# Patient Record
Sex: FEMALE | Race: WHITE | Employment: OTHER | URBAN - NONMETROPOLITAN AREA
[De-identification: names, ages, dates, MRNs, and addresses within clinical notes are randomized per-mention and may not be internally consistent; named-entity substitution may affect disease eponyms.]

---

## 2017-12-14 ENCOUNTER — OFFICE VISIT (OUTPATIENT)
Dept: FAMILY MEDICINE CLINIC | Age: 82
End: 2017-12-14
Payer: MEDICARE

## 2017-12-14 VITALS
WEIGHT: 145 LBS | RESPIRATION RATE: 16 BRPM | TEMPERATURE: 98.6 F | DIASTOLIC BLOOD PRESSURE: 72 MMHG | OXYGEN SATURATION: 98 % | HEART RATE: 78 BPM | SYSTOLIC BLOOD PRESSURE: 118 MMHG

## 2017-12-14 DIAGNOSIS — I10 ESSENTIAL (PRIMARY) HYPERTENSION: ICD-10-CM

## 2017-12-14 DIAGNOSIS — R60.0 EDEMA OF LEFT LOWER EXTREMITY: ICD-10-CM

## 2017-12-14 DIAGNOSIS — N32.81 OAB (OVERACTIVE BLADDER): ICD-10-CM

## 2017-12-14 DIAGNOSIS — E05.90 SUBCLINICAL HYPERTHYROIDISM: ICD-10-CM

## 2017-12-14 DIAGNOSIS — F03.90 MAJOR NEUROCOGNITIVE DISORDER (HCC): Primary | ICD-10-CM

## 2017-12-14 DIAGNOSIS — E78.01 FAMILIAL HYPERCHOLESTEROLEMIA: ICD-10-CM

## 2017-12-14 PROCEDURE — 99204 OFFICE O/P NEW MOD 45 MIN: CPT | Performed by: FAMILY MEDICINE

## 2017-12-14 PROCEDURE — G8400 PT W/DXA NO RESULTS DOC: HCPCS | Performed by: FAMILY MEDICINE

## 2017-12-14 PROCEDURE — 1123F ACP DISCUSS/DSCN MKR DOCD: CPT | Performed by: FAMILY MEDICINE

## 2017-12-14 PROCEDURE — 1036F TOBACCO NON-USER: CPT | Performed by: FAMILY MEDICINE

## 2017-12-14 PROCEDURE — 4040F PNEUMOC VAC/ADMIN/RCVD: CPT | Performed by: FAMILY MEDICINE

## 2017-12-14 PROCEDURE — G8421 BMI NOT CALCULATED: HCPCS | Performed by: FAMILY MEDICINE

## 2017-12-14 PROCEDURE — 1090F PRES/ABSN URINE INCON ASSESS: CPT | Performed by: FAMILY MEDICINE

## 2017-12-14 PROCEDURE — G8484 FLU IMMUNIZE NO ADMIN: HCPCS | Performed by: FAMILY MEDICINE

## 2017-12-14 PROCEDURE — G8427 DOCREV CUR MEDS BY ELIG CLIN: HCPCS | Performed by: FAMILY MEDICINE

## 2017-12-14 RX ORDER — MULTIVIT-MIN/IRON/FOLIC ACID/K 18-600-40
CAPSULE ORAL
COMMUNITY

## 2017-12-14 RX ORDER — LISINOPRIL 10 MG/1
10 TABLET ORAL EVERY MORNING
Qty: 30 TABLET | Refills: 11 | Status: SHIPPED | OUTPATIENT
Start: 2017-12-14 | End: 2018-03-30 | Stop reason: SDUPTHER

## 2017-12-14 RX ORDER — DONEPEZIL HYDROCHLORIDE 10 MG/1
10 TABLET, FILM COATED ORAL NIGHTLY
COMMUNITY
Start: 2017-09-16 | End: 2017-12-14 | Stop reason: SDUPTHER

## 2017-12-14 RX ORDER — SOLIFENACIN SUCCINATE 10 MG/1
10 TABLET, FILM COATED ORAL EVERY MORNING
COMMUNITY
Start: 2017-11-13 | End: 2017-12-15 | Stop reason: ALTCHOICE

## 2017-12-14 RX ORDER — PRAVASTATIN SODIUM 80 MG/1
80 TABLET ORAL NIGHTLY
Qty: 30 TABLET | Refills: 11 | Status: SHIPPED | OUTPATIENT
Start: 2017-12-14 | End: 2018-03-30 | Stop reason: SDUPTHER

## 2017-12-14 RX ORDER — TOLTERODINE 4 MG/1
4 CAPSULE, EXTENDED RELEASE ORAL DAILY
Qty: 30 CAPSULE | Refills: 5 | Status: SHIPPED | OUTPATIENT
Start: 2017-12-14 | End: 2018-02-19 | Stop reason: ALTCHOICE

## 2017-12-14 RX ORDER — LISINOPRIL 10 MG/1
10 TABLET ORAL EVERY MORNING
COMMUNITY
Start: 2017-09-16 | End: 2017-12-14 | Stop reason: SDUPTHER

## 2017-12-14 RX ORDER — PRAVASTATIN SODIUM 80 MG/1
80 TABLET ORAL NIGHTLY
COMMUNITY
Start: 2017-09-16 | End: 2017-12-14 | Stop reason: SDUPTHER

## 2017-12-14 RX ORDER — MEMANTINE HYDROCHLORIDE 5 MG/1
5 TABLET ORAL 2 TIMES DAILY
Qty: 60 TABLET | Refills: 11 | Status: SHIPPED | OUTPATIENT
Start: 2017-12-14 | End: 2018-02-19 | Stop reason: SDUPTHER

## 2017-12-14 RX ORDER — DONEPEZIL HYDROCHLORIDE 10 MG/1
10 TABLET, FILM COATED ORAL NIGHTLY
Qty: 30 TABLET | Refills: 11 | Status: SHIPPED | OUTPATIENT
Start: 2017-12-14 | End: 2018-12-14 | Stop reason: SDUPTHER

## 2017-12-14 NOTE — PROGRESS NOTES
exhibits no edema. Neurological: She is alert. Psychiatric: She has a normal mood and affect. Her behavior is normal.         No results found for this or any previous visit (from the past 672 hour(s)). Assessment & Plan: The following diagnoses and conditions are stable with no further orders unless indicated:  1. Major neurocognitive disorder  Reviewed past medical history. No adverse effects with Aricpet. We'll add Namenda. Discussed risks and benefits of this new medication. Discussed potential side effects. She voiced understanding. Recommend starting 5 mg daily for the next week. Increase to 5 mg twice a day. - donepezil (ARICEPT) 10 MG tablet; Take 1 tablet by mouth nightly  Dispense: 30 tablet; Refill: 11  - memantine (NAMENDA) 5 MG tablet; Take 1 tablet by mouth 2 times daily  Dispense: 60 tablet; Refill: 11    2. Essential (primary) hypertension  Blood pressure is controlled with current medication refilled  - lisinopril (PRINIVIL;ZESTRIL) 10 MG tablet; Take 1 tablet by mouth every morning  Dispense: 30 tablet; Refill: 11    3. OAB (overactive bladder)  Does not appear to be experiencing significant improvement with Vesicare. Will switch to Detrol  Discussed risks and benefits of this new medication. Discussed potential side effects. She voiced understanding.     - tolterodine (DETROL LA) 4 MG extended release capsule; Take 1 capsule by mouth daily  Dispense: 30 capsule; Refill: 5    4. Familial hypercholesterolemia  Continue Pravachol.  - pravastatin (PRAVACHOL) 80 MG tablet; Take 1 tablet by mouth nightly  Dispense: 30 tablet; Refill: 11    5. Subclinical hyperthyroidism  Reviewed laboratory work showing suppressed TSH with T3 and T4 have been stable    6. Edema of left lower extremity  Have a history of DVT of the left lower extremity. Has experienced swelling since then. Was a compression stocking with excellent results. No cramping redness or discomfort.     Return in

## 2017-12-15 PROBLEM — R60.0 EDEMA OF LEFT LOWER EXTREMITY: Status: ACTIVE | Noted: 2017-12-15

## 2017-12-15 PROBLEM — E05.90 SUBCLINICAL HYPERTHYROIDISM: Status: ACTIVE | Noted: 2017-12-15

## 2017-12-15 ASSESSMENT — ENCOUNTER SYMPTOMS
COUGH: 0
DIARRHEA: 0
CHEST TIGHTNESS: 0
ANAL BLEEDING: 0
CONSTIPATION: 0
NAUSEA: 0
SHORTNESS OF BREATH: 0
ABDOMINAL PAIN: 0

## 2018-02-19 ENCOUNTER — OFFICE VISIT (OUTPATIENT)
Dept: FAMILY MEDICINE CLINIC | Age: 83
End: 2018-02-19
Payer: MEDICARE

## 2018-02-19 VITALS
DIASTOLIC BLOOD PRESSURE: 62 MMHG | HEART RATE: 74 BPM | SYSTOLIC BLOOD PRESSURE: 118 MMHG | TEMPERATURE: 97.4 F | OXYGEN SATURATION: 98 % | WEIGHT: 143 LBS | RESPIRATION RATE: 16 BRPM

## 2018-02-19 DIAGNOSIS — F03.90 MAJOR NEUROCOGNITIVE DISORDER (HCC): Primary | ICD-10-CM

## 2018-02-19 DIAGNOSIS — F03.90 MAJOR NEUROCOGNITIVE DISORDER (HCC): ICD-10-CM

## 2018-02-19 DIAGNOSIS — I10 ESSENTIAL (PRIMARY) HYPERTENSION: ICD-10-CM

## 2018-02-19 DIAGNOSIS — E05.90 SUBCLINICAL HYPERTHYROIDISM: ICD-10-CM

## 2018-02-19 DIAGNOSIS — E78.01 FAMILIAL HYPERCHOLESTEROLEMIA: ICD-10-CM

## 2018-02-19 DIAGNOSIS — N32.81 OAB (OVERACTIVE BLADDER): ICD-10-CM

## 2018-02-19 LAB
ALBUMIN SERPL-MCNC: 4.3 G/DL (ref 3.5–5.2)
ALP BLD-CCNC: 51 U/L (ref 35–104)
ALT SERPL-CCNC: 19 U/L (ref 5–33)
ANION GAP SERPL CALCULATED.3IONS-SCNC: 12 MMOL/L (ref 7–19)
AST SERPL-CCNC: 20 U/L (ref 5–32)
BASOPHILS ABSOLUTE: 0.1 K/UL (ref 0–0.2)
BASOPHILS RELATIVE PERCENT: 0.7 % (ref 0–1)
BILIRUB SERPL-MCNC: 0.6 MG/DL (ref 0.2–1.2)
BUN BLDV-MCNC: 14 MG/DL (ref 8–23)
CALCIUM SERPL-MCNC: 9.7 MG/DL (ref 8.8–10.2)
CHLORIDE BLD-SCNC: 99 MMOL/L (ref 98–111)
CHOLESTEROL, TOTAL: 174 MG/DL (ref 160–199)
CO2: 29 MMOL/L (ref 22–29)
CREAT SERPL-MCNC: 0.7 MG/DL (ref 0.5–0.9)
EOSINOPHILS ABSOLUTE: 0.1 K/UL (ref 0–0.6)
EOSINOPHILS RELATIVE PERCENT: 1.9 % (ref 0–5)
FOLATE: >20 NG/ML (ref 4.8–37.3)
GFR NON-AFRICAN AMERICAN: >60
GLUCOSE BLD-MCNC: 83 MG/DL (ref 74–109)
HCT VFR BLD CALC: 44.8 % (ref 37–47)
HDLC SERPL-MCNC: 74 MG/DL (ref 65–121)
HEMOGLOBIN: 14.2 G/DL (ref 12–16)
LDL CHOLESTEROL CALCULATED: 85 MG/DL
LYMPHOCYTES ABSOLUTE: 1.5 K/UL (ref 1.1–4.5)
LYMPHOCYTES RELATIVE PERCENT: 22.3 % (ref 20–40)
MCH RBC QN AUTO: 30 PG (ref 27–31)
MCHC RBC AUTO-ENTMCNC: 31.7 G/DL (ref 33–37)
MCV RBC AUTO: 94.7 FL (ref 81–99)
MONOCYTES ABSOLUTE: 0.5 K/UL (ref 0–0.9)
MONOCYTES RELATIVE PERCENT: 7.3 % (ref 0–10)
NEUTROPHILS ABSOLUTE: 4.6 K/UL (ref 1.5–7.5)
NEUTROPHILS RELATIVE PERCENT: 67.5 % (ref 50–65)
PDW BLD-RTO: 12.4 % (ref 11.5–14.5)
PLATELET # BLD: 231 K/UL (ref 130–400)
PMV BLD AUTO: 9.9 FL (ref 9.4–12.3)
POTASSIUM SERPL-SCNC: 4.2 MMOL/L (ref 3.5–5)
RBC # BLD: 4.73 M/UL (ref 4.2–5.4)
SEDIMENTATION RATE, ERYTHROCYTE: 13 MM/HR (ref 0–25)
SODIUM BLD-SCNC: 140 MMOL/L (ref 136–145)
T4 FREE: 1.3 NG/DL (ref 0.9–1.7)
TOTAL PROTEIN: 7.5 G/DL (ref 6.6–8.7)
TRIGL SERPL-MCNC: 76 MG/DL (ref 0–149)
TSH SERPL DL<=0.05 MIU/L-ACNC: <0.005 UIU/ML (ref 0.27–4.2)
VITAMIN B-12: 355 PG/ML (ref 211–946)
WBC # BLD: 6.9 K/UL (ref 4.8–10.8)

## 2018-02-19 PROCEDURE — G8421 BMI NOT CALCULATED: HCPCS | Performed by: FAMILY MEDICINE

## 2018-02-19 PROCEDURE — 1123F ACP DISCUSS/DSCN MKR DOCD: CPT | Performed by: FAMILY MEDICINE

## 2018-02-19 PROCEDURE — G8427 DOCREV CUR MEDS BY ELIG CLIN: HCPCS | Performed by: FAMILY MEDICINE

## 2018-02-19 PROCEDURE — 99214 OFFICE O/P EST MOD 30 MIN: CPT | Performed by: FAMILY MEDICINE

## 2018-02-19 PROCEDURE — 1090F PRES/ABSN URINE INCON ASSESS: CPT | Performed by: FAMILY MEDICINE

## 2018-02-19 PROCEDURE — G8484 FLU IMMUNIZE NO ADMIN: HCPCS | Performed by: FAMILY MEDICINE

## 2018-02-19 PROCEDURE — G8400 PT W/DXA NO RESULTS DOC: HCPCS | Performed by: FAMILY MEDICINE

## 2018-02-19 PROCEDURE — 1036F TOBACCO NON-USER: CPT | Performed by: FAMILY MEDICINE

## 2018-02-19 PROCEDURE — 4040F PNEUMOC VAC/ADMIN/RCVD: CPT | Performed by: FAMILY MEDICINE

## 2018-02-19 RX ORDER — MEMANTINE HYDROCHLORIDE 10 MG/1
10 TABLET ORAL 2 TIMES DAILY
Qty: 60 TABLET | Refills: 5 | Status: SHIPPED | OUTPATIENT
Start: 2018-02-19 | End: 2018-08-24 | Stop reason: SDUPTHER

## 2018-02-19 ASSESSMENT — ENCOUNTER SYMPTOMS
CONSTIPATION: 0
COUGH: 0
CHEST TIGHTNESS: 0
NAUSEA: 0
ABDOMINAL PAIN: 0
SHORTNESS OF BREATH: 0
ANAL BLEEDING: 0
DIARRHEA: 0

## 2018-02-19 NOTE — PROGRESS NOTES
[facility]  [facility]ICINE  [facility]  [address] [place] [id number]  Dept: [phone]  Dept Fax: [phone]  Loc: [phone]    Mercedes Erickson is a [de-identified] y.o. female who presents today for her medical conditions/complaints as noted below. Mercedes Erickson is here for Hypertension        HPI:02   CC:  Here today to discuss the following:    Here for follow-up today. Established with me on December 14. Suffers from Alzheimer's dementia. She is stable since last visit. No major    Hypertension  Compliant with medications. No adverse effects from medication. No lightheadedness, palpitations, or chest pain. Hyperlipidemia  Tolerating current cholesterol medication without side effects. No body aches. Attempting to reduce processed sugar and cholesterol from diet. They have tried Vesicare and Detrol for overactive bladder. Both medications were over $100. They did not feel they provided much relief and have discontinued the medication.               HPI    Past Medical History:   Diagnosis Date    DVT (deep venous thrombosis) (HCC)     left lower extremity      Past Surgical History:   Procedure Laterality Date    COLONOSCOPY  2012       Family History   Problem Relation Age of Onset    Colon Cancer Daughter        Social History   Substance Use Topics    Smoking status: Never Smoker    Smokeless tobacco: Never Used    Alcohol use No      Current Outpatient Prescriptions   Medication Sig Dispense Refill    memantine (NAMENDA) 10 MG tablet Take 1 tablet by mouth 2 times daily 60 tablet 5    Cholecalciferol (VITAMIN D) 2000 units CAPS capsule Take by mouth      aspirin 81 MG tablet Take 81 mg by mouth daily      donepezil (ARICEPT) 10 MG tablet Take 1 tablet by mouth nightly 30 tablet 11    lisinopril (PRINIVIL;ZESTRIL) 10 MG tablet Take 1 tablet by mouth every morning 30 tablet 11    pravastatin (PRAVACHOL) 80 MG tablet Take 1 tablet by mouth nightly 30 tablet 11     No current facility-administered medications for this visit. No Known Allergies    Health Maintenance   Topic Date Due    TSH testing  1935    Potassium monitoring  1935    Creatinine monitoring  1935    DTaP/Tdap/Td vaccine (1 - Tdap) 05/24/1956    Zostavax vaccine  05/24/1997    DEXA (modify frequency per FRAX score)  05/24/2002    Flu vaccine  Completed    Pneumococcal low/med risk  Completed       Subjective:      Review of Systems   Constitutional: Negative for chills and fever. HENT: Negative for congestion. Respiratory: Negative for cough, chest tightness and shortness of breath. Cardiovascular: Negative for chest pain, palpitations and leg swelling. Gastrointestinal: Negative for abdominal pain, anal bleeding, constipation, diarrhea and nausea. Genitourinary: Negative for difficulty urinating. Psychiatric/Behavioral: Negative. See HPI for visit specific review of symptoms. All others negative      Objective:   /62   Pulse 74   Temp 97.4 °F (36.3 °C) (Temporal)   Resp 16   Wt 143 lb (64.9 kg)   SpO2 98%   Physical Exam   Constitutional: She appears well-developed. She does not appear ill. Eyes: Pupils are equal, round, and reactive to light. Neck: Normal range of motion. Neck supple. Cardiovascular: Normal rate and regular rhythm. Exam reveals no friction rub. No murmur heard. Pulmonary/Chest: Effort normal and breath sounds normal. No respiratory distress. She has no wheezes. She has no rales. Abdominal: Soft. Bowel sounds are normal. She exhibits no distension. There is no tenderness. There is no rebound and no guarding. Musculoskeletal: She exhibits no edema. Neurological: She is alert. Psychiatric: She has a normal mood and affect. Her behavior is normal.         No results found for this or any previous visit (from the past 672 hour(s)). Assessment & Plan:         The following diagnoses and

## 2018-02-22 LAB — ANA IGG, ELISA: NORMAL

## 2018-03-30 DIAGNOSIS — E78.01 FAMILIAL HYPERCHOLESTEROLEMIA: ICD-10-CM

## 2018-03-30 DIAGNOSIS — I10 ESSENTIAL (PRIMARY) HYPERTENSION: ICD-10-CM

## 2018-03-30 RX ORDER — LISINOPRIL 10 MG/1
10 TABLET ORAL EVERY MORNING
Qty: 90 TABLET | Refills: 3 | Status: SHIPPED | OUTPATIENT
Start: 2018-03-30 | End: 2019-09-14 | Stop reason: SDUPTHER

## 2018-03-30 RX ORDER — PRAVASTATIN SODIUM 80 MG/1
80 TABLET ORAL NIGHTLY
Qty: 90 TABLET | Refills: 3 | Status: SHIPPED | OUTPATIENT
Start: 2018-03-30 | End: 2019-09-14 | Stop reason: SDUPTHER

## 2018-06-19 ENCOUNTER — OFFICE VISIT (OUTPATIENT)
Dept: FAMILY MEDICINE CLINIC | Age: 83
End: 2018-06-19
Payer: MEDICARE

## 2018-06-19 VITALS
WEIGHT: 152 LBS | SYSTOLIC BLOOD PRESSURE: 112 MMHG | HEIGHT: 64 IN | OXYGEN SATURATION: 95 % | DIASTOLIC BLOOD PRESSURE: 80 MMHG | HEART RATE: 78 BPM | RESPIRATION RATE: 16 BRPM | TEMPERATURE: 97.8 F | BODY MASS INDEX: 25.95 KG/M2

## 2018-06-19 DIAGNOSIS — E78.01 FAMILIAL HYPERCHOLESTEROLEMIA: ICD-10-CM

## 2018-06-19 DIAGNOSIS — I10 ESSENTIAL (PRIMARY) HYPERTENSION: ICD-10-CM

## 2018-06-19 DIAGNOSIS — R60.0 EDEMA OF LEFT LOWER EXTREMITY: ICD-10-CM

## 2018-06-19 DIAGNOSIS — Z00.00 ANNUAL PHYSICAL EXAM: Primary | ICD-10-CM

## 2018-06-19 DIAGNOSIS — F03.90 MAJOR NEUROCOGNITIVE DISORDER (HCC): ICD-10-CM

## 2018-06-19 DIAGNOSIS — N32.81 OAB (OVERACTIVE BLADDER): ICD-10-CM

## 2018-06-19 DIAGNOSIS — E05.90 SUBCLINICAL HYPERTHYROIDISM: ICD-10-CM

## 2018-06-19 PROCEDURE — 1090F PRES/ABSN URINE INCON ASSESS: CPT | Performed by: FAMILY MEDICINE

## 2018-06-19 PROCEDURE — 99214 OFFICE O/P EST MOD 30 MIN: CPT | Performed by: FAMILY MEDICINE

## 2018-06-19 PROCEDURE — 1036F TOBACCO NON-USER: CPT | Performed by: FAMILY MEDICINE

## 2018-06-19 PROCEDURE — G8400 PT W/DXA NO RESULTS DOC: HCPCS | Performed by: FAMILY MEDICINE

## 2018-06-19 PROCEDURE — 4040F PNEUMOC VAC/ADMIN/RCVD: CPT | Performed by: FAMILY MEDICINE

## 2018-06-19 PROCEDURE — G0439 PPPS, SUBSEQ VISIT: HCPCS | Performed by: FAMILY MEDICINE

## 2018-06-19 PROCEDURE — 1123F ACP DISCUSS/DSCN MKR DOCD: CPT | Performed by: FAMILY MEDICINE

## 2018-06-19 PROCEDURE — G8419 CALC BMI OUT NRM PARAM NOF/U: HCPCS | Performed by: FAMILY MEDICINE

## 2018-06-19 PROCEDURE — G8427 DOCREV CUR MEDS BY ELIG CLIN: HCPCS | Performed by: FAMILY MEDICINE

## 2018-06-19 ASSESSMENT — PATIENT HEALTH QUESTIONNAIRE - PHQ9: SUM OF ALL RESPONSES TO PHQ QUESTIONS 1-9: 0

## 2018-06-19 ASSESSMENT — ANXIETY QUESTIONNAIRES: GAD7 TOTAL SCORE: 0

## 2018-06-19 ASSESSMENT — LIFESTYLE VARIABLES: HOW OFTEN DO YOU HAVE A DRINK CONTAINING ALCOHOL: 0

## 2018-06-23 ENCOUNTER — APPOINTMENT (OUTPATIENT)
Dept: CT IMAGING | Facility: HOSPITAL | Age: 83
End: 2018-06-23

## 2018-06-23 ENCOUNTER — HOSPITAL ENCOUNTER (EMERGENCY)
Facility: HOSPITAL | Age: 83
Discharge: HOME | End: 2018-06-23
Attending: EMERGENCY MEDICINE | Admitting: EMERGENCY MEDICINE

## 2018-06-23 ENCOUNTER — APPOINTMENT (OUTPATIENT)
Dept: GENERAL RADIOLOGY | Facility: HOSPITAL | Age: 83
End: 2018-06-23

## 2018-06-23 VITALS
WEIGHT: 150 LBS | DIASTOLIC BLOOD PRESSURE: 63 MMHG | SYSTOLIC BLOOD PRESSURE: 118 MMHG | BODY MASS INDEX: 25.61 KG/M2 | OXYGEN SATURATION: 96 % | HEIGHT: 64 IN | HEART RATE: 67 BPM | RESPIRATION RATE: 16 BRPM | TEMPERATURE: 98.2 F
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
## 2018-06-23 DIAGNOSIS — S61.412A LACERATION OF LEFT HAND WITHOUT FOREIGN BODY, INITIAL ENCOUNTER: ICD-10-CM

## 2018-06-23 DIAGNOSIS — S32.030A CLOSED COMPRESSION FRACTURE OF THIRD LUMBAR VERTEBRA, INITIAL ENCOUNTER: Primary | ICD-10-CM

## 2018-06-23 PROCEDURE — 99284 EMERGENCY DEPT VISIT MOD MDM: CPT

## 2018-06-23 PROCEDURE — 72110 X-RAY EXAM L-2 SPINE 4/>VWS: CPT

## 2018-06-23 PROCEDURE — 73130 X-RAY EXAM OF HAND: CPT

## 2018-06-23 PROCEDURE — 90714 TD VACC NO PRESV 7 YRS+ IM: CPT | Performed by: EMERGENCY MEDICINE

## 2018-06-23 PROCEDURE — 25010000002 TETANUS-DIPHTHERIA TOXOIDS (ADULT) PER 0.5 ML: Performed by: EMERGENCY MEDICINE

## 2018-06-23 PROCEDURE — 90471 IMMUNIZATION ADMIN: CPT

## 2018-06-23 PROCEDURE — 72131 CT LUMBAR SPINE W/O DYE: CPT

## 2018-06-23 RX ORDER — PRAVASTATIN SODIUM 40 MG
80 TABLET ORAL NIGHTLY
COMMUNITY

## 2018-06-23 RX ORDER — HYDROCODONE BITARTRATE AND ACETAMINOPHEN 5; 325 MG/1; MG/1
1 TABLET ORAL ONCE
Status: COMPLETED | OUTPATIENT
Start: 2018-06-23 | End: 2018-06-23

## 2018-06-23 RX ORDER — HYDROCODONE BITARTRATE AND ACETAMINOPHEN 5; 325 MG/1; MG/1
1 TABLET ORAL EVERY 4 HOURS PRN
Qty: 12 TABLET | Refills: 0 | Status: SHIPPED | OUTPATIENT
Start: 2018-06-23

## 2018-06-23 RX ORDER — LISINOPRIL 10 MG/1
10 TABLET ORAL DAILY
COMMUNITY

## 2018-06-23 RX ORDER — ASPIRIN 81 MG/1
81 TABLET ORAL DAILY
COMMUNITY

## 2018-06-23 RX ORDER — DONEPEZIL HYDROCHLORIDE 10 MG/1
10 TABLET, FILM COATED ORAL NIGHTLY
COMMUNITY

## 2018-06-23 RX ORDER — MEMANTINE HYDROCHLORIDE 10 MG/1
10 TABLET ORAL 2 TIMES DAILY
COMMUNITY

## 2018-06-23 RX ADMIN — CLOSTRIDIUM TETANI TOXOID ANTIGEN (FORMALDEHYDE INACTIVATED) AND CORYNEBACTERIUM DIPHTHERIAE TOXOID ANTIGEN (FORMALDEHYDE INACTIVATED) 0.5 ML: 5; 2 INJECTION, SUSPENSION INTRAMUSCULAR at 03:55

## 2018-06-23 RX ADMIN — HYDROCODONE BITARTRATE AND ACETAMINOPHEN 1 TABLET: 5; 325 TABLET ORAL at 03:54

## 2018-07-27 PROBLEM — M81.0 AGE-RELATED OSTEOPOROSIS WITHOUT CURRENT PATHOLOGICAL FRACTURE: Status: ACTIVE | Noted: 2018-07-27

## 2018-08-17 ENCOUNTER — APPOINTMENT (OUTPATIENT)
Dept: ONCOLOGY | Facility: HOSPITAL | Age: 83
End: 2018-08-17

## 2018-09-27 ENCOUNTER — TELEPHONE (OUTPATIENT)
Dept: FAMILY MEDICINE CLINIC | Age: 83
End: 2018-09-27

## 2018-12-14 DIAGNOSIS — F03.90 MAJOR NEUROCOGNITIVE DISORDER (HCC): ICD-10-CM

## 2018-12-14 RX ORDER — DONEPEZIL HYDROCHLORIDE 10 MG/1
TABLET, FILM COATED ORAL
Qty: 30 TABLET | Refills: 11 | Status: SHIPPED | OUTPATIENT
Start: 2018-12-14 | End: 2019-01-01

## 2018-12-17 RX ORDER — DONEPEZIL HYDROCHLORIDE 10 MG/1
10 TABLET, FILM COATED ORAL NIGHTLY
Qty: 30 TABLET | Refills: 11 | OUTPATIENT
Start: 2018-12-17

## 2019-01-01 ENCOUNTER — HOSPITAL ENCOUNTER (OUTPATIENT)
Dept: MRI IMAGING | Age: 84
Discharge: HOME | End: 2019-12-16
Payer: MEDICARE

## 2019-01-01 ENCOUNTER — OFFICE VISIT (OUTPATIENT)
Dept: NEUROLOGY | Age: 84
End: 2019-01-01
Payer: MEDICARE

## 2019-01-01 ENCOUNTER — TELEPHONE (OUTPATIENT)
Dept: FAMILY MEDICINE CLINIC | Age: 84
End: 2019-01-01

## 2019-01-01 VITALS
BODY MASS INDEX: 27.29 KG/M2 | SYSTOLIC BLOOD PRESSURE: 121 MMHG | HEART RATE: 85 BPM | DIASTOLIC BLOOD PRESSURE: 67 MMHG | WEIGHT: 154 LBS | HEIGHT: 63 IN

## 2019-01-01 DIAGNOSIS — R41.3 MEMORY LOSS: Primary | ICD-10-CM

## 2019-01-01 DIAGNOSIS — R27.0 ATAXIA: ICD-10-CM

## 2019-01-01 DIAGNOSIS — Z86.39 HISTORY OF HYPERLIPIDEMIA: ICD-10-CM

## 2019-01-01 DIAGNOSIS — R41.3 MEMORY LOSS: ICD-10-CM

## 2019-01-01 PROCEDURE — G8482 FLU IMMUNIZE ORDER/ADMIN: HCPCS | Performed by: PSYCHIATRY & NEUROLOGY

## 2019-01-01 PROCEDURE — 4040F PNEUMOC VAC/ADMIN/RCVD: CPT | Performed by: PSYCHIATRY & NEUROLOGY

## 2019-01-01 PROCEDURE — G8417 CALC BMI ABV UP PARAM F/U: HCPCS | Performed by: PSYCHIATRY & NEUROLOGY

## 2019-01-01 PROCEDURE — 1036F TOBACCO NON-USER: CPT | Performed by: PSYCHIATRY & NEUROLOGY

## 2019-01-01 PROCEDURE — G8400 PT W/DXA NO RESULTS DOC: HCPCS | Performed by: PSYCHIATRY & NEUROLOGY

## 2019-01-01 PROCEDURE — 1090F PRES/ABSN URINE INCON ASSESS: CPT | Performed by: PSYCHIATRY & NEUROLOGY

## 2019-01-01 PROCEDURE — G8427 DOCREV CUR MEDS BY ELIG CLIN: HCPCS | Performed by: PSYCHIATRY & NEUROLOGY

## 2019-01-01 PROCEDURE — 70551 MRI BRAIN STEM W/O DYE: CPT

## 2019-01-01 PROCEDURE — 99204 OFFICE O/P NEW MOD 45 MIN: CPT | Performed by: PSYCHIATRY & NEUROLOGY

## 2019-01-01 PROCEDURE — 1123F ACP DISCUSS/DSCN MKR DOCD: CPT | Performed by: PSYCHIATRY & NEUROLOGY

## 2019-01-01 RX ORDER — LANOLIN ALCOHOL/MO/W.PET/CERES
3 CREAM (GRAM) TOPICAL NIGHTLY PRN
COMMUNITY

## 2019-02-18 ENCOUNTER — OFFICE VISIT (OUTPATIENT)
Dept: FAMILY MEDICINE CLINIC | Age: 84
End: 2019-02-18
Payer: MEDICARE

## 2019-02-18 VITALS
BODY MASS INDEX: 27.98 KG/M2 | HEART RATE: 69 BPM | DIASTOLIC BLOOD PRESSURE: 68 MMHG | SYSTOLIC BLOOD PRESSURE: 122 MMHG | WEIGHT: 163 LBS | OXYGEN SATURATION: 98 % | TEMPERATURE: 97.7 F

## 2019-02-18 DIAGNOSIS — F03.90 MAJOR NEUROCOGNITIVE DISORDER (HCC): ICD-10-CM

## 2019-02-18 DIAGNOSIS — I10 ESSENTIAL (PRIMARY) HYPERTENSION: ICD-10-CM

## 2019-02-18 DIAGNOSIS — E78.01 FAMILIAL HYPERCHOLESTEROLEMIA: Primary | ICD-10-CM

## 2019-02-18 DIAGNOSIS — R53.83 OTHER FATIGUE: ICD-10-CM

## 2019-02-18 PROCEDURE — G8419 CALC BMI OUT NRM PARAM NOF/U: HCPCS | Performed by: FAMILY MEDICINE

## 2019-02-18 PROCEDURE — 1101F PT FALLS ASSESS-DOCD LE1/YR: CPT | Performed by: FAMILY MEDICINE

## 2019-02-18 PROCEDURE — 1123F ACP DISCUSS/DSCN MKR DOCD: CPT | Performed by: FAMILY MEDICINE

## 2019-02-18 PROCEDURE — 99214 OFFICE O/P EST MOD 30 MIN: CPT | Performed by: FAMILY MEDICINE

## 2019-02-18 PROCEDURE — 1090F PRES/ABSN URINE INCON ASSESS: CPT | Performed by: FAMILY MEDICINE

## 2019-02-18 PROCEDURE — G8427 DOCREV CUR MEDS BY ELIG CLIN: HCPCS | Performed by: FAMILY MEDICINE

## 2019-02-18 PROCEDURE — 1036F TOBACCO NON-USER: CPT | Performed by: FAMILY MEDICINE

## 2019-02-18 PROCEDURE — 4040F PNEUMOC VAC/ADMIN/RCVD: CPT | Performed by: FAMILY MEDICINE

## 2019-02-18 PROCEDURE — G8400 PT W/DXA NO RESULTS DOC: HCPCS | Performed by: FAMILY MEDICINE

## 2019-02-18 PROCEDURE — G8482 FLU IMMUNIZE ORDER/ADMIN: HCPCS | Performed by: FAMILY MEDICINE

## 2019-02-18 ASSESSMENT — PATIENT HEALTH QUESTIONNAIRE - PHQ9
SUM OF ALL RESPONSES TO PHQ9 QUESTIONS 1 & 2: 0
SUM OF ALL RESPONSES TO PHQ QUESTIONS 1-9: 0
1. LITTLE INTEREST OR PLEASURE IN DOING THINGS: 0
2. FEELING DOWN, DEPRESSED OR HOPELESS: 0
SUM OF ALL RESPONSES TO PHQ QUESTIONS 1-9: 0

## 2019-08-13 DIAGNOSIS — F03.90 MAJOR NEUROCOGNITIVE DISORDER (HCC): ICD-10-CM

## 2019-08-13 RX ORDER — MEMANTINE HYDROCHLORIDE 10 MG/1
TABLET ORAL
Qty: 180 TABLET | Refills: 3 | Status: SHIPPED | OUTPATIENT
Start: 2019-08-13 | End: 2020-01-01 | Stop reason: SDUPTHER

## 2019-09-11 ENCOUNTER — TELEPHONE (OUTPATIENT)
Dept: ADMINISTRATIVE | Age: 84
End: 2019-09-11

## 2019-09-14 DIAGNOSIS — E78.01 FAMILIAL HYPERCHOLESTEROLEMIA: ICD-10-CM

## 2019-09-14 DIAGNOSIS — I10 ESSENTIAL (PRIMARY) HYPERTENSION: ICD-10-CM

## 2019-09-16 RX ORDER — PRAVASTATIN SODIUM 80 MG/1
80 TABLET ORAL NIGHTLY
Qty: 90 TABLET | Refills: 3 | Status: SHIPPED | OUTPATIENT
Start: 2019-09-16 | End: 2019-10-04 | Stop reason: SDUPTHER

## 2019-09-16 RX ORDER — LISINOPRIL 10 MG/1
TABLET ORAL
Qty: 90 TABLET | Refills: 3 | Status: SHIPPED | OUTPATIENT
Start: 2019-09-16

## 2019-10-04 ENCOUNTER — OFFICE VISIT (OUTPATIENT)
Dept: FAMILY MEDICINE CLINIC | Age: 84
End: 2019-10-04
Payer: MEDICARE

## 2019-10-04 VITALS
HEART RATE: 72 BPM | HEIGHT: 63 IN | SYSTOLIC BLOOD PRESSURE: 102 MMHG | WEIGHT: 151 LBS | DIASTOLIC BLOOD PRESSURE: 64 MMHG | OXYGEN SATURATION: 96 % | TEMPERATURE: 97.3 F | BODY MASS INDEX: 26.75 KG/M2

## 2019-10-04 DIAGNOSIS — I10 ESSENTIAL (PRIMARY) HYPERTENSION: ICD-10-CM

## 2019-10-04 DIAGNOSIS — F03.90 MAJOR NEUROCOGNITIVE DISORDER (HCC): ICD-10-CM

## 2019-10-04 DIAGNOSIS — R29.898 MUSCULAR DECONDITIONING: ICD-10-CM

## 2019-10-04 DIAGNOSIS — E78.01 FAMILIAL HYPERCHOLESTEROLEMIA: ICD-10-CM

## 2019-10-04 DIAGNOSIS — Z23 NEED FOR INFLUENZA VACCINATION: Primary | ICD-10-CM

## 2019-10-04 PROCEDURE — G8419 CALC BMI OUT NRM PARAM NOF/U: HCPCS | Performed by: FAMILY MEDICINE

## 2019-10-04 PROCEDURE — G8482 FLU IMMUNIZE ORDER/ADMIN: HCPCS | Performed by: FAMILY MEDICINE

## 2019-10-04 PROCEDURE — 99214 OFFICE O/P EST MOD 30 MIN: CPT | Performed by: FAMILY MEDICINE

## 2019-10-04 PROCEDURE — G8427 DOCREV CUR MEDS BY ELIG CLIN: HCPCS | Performed by: FAMILY MEDICINE

## 2019-10-04 PROCEDURE — 4040F PNEUMOC VAC/ADMIN/RCVD: CPT | Performed by: FAMILY MEDICINE

## 2019-10-04 PROCEDURE — 1123F ACP DISCUSS/DSCN MKR DOCD: CPT | Performed by: FAMILY MEDICINE

## 2019-10-04 PROCEDURE — G0008 ADMIN INFLUENZA VIRUS VAC: HCPCS | Performed by: FAMILY MEDICINE

## 2019-10-04 PROCEDURE — 1090F PRES/ABSN URINE INCON ASSESS: CPT | Performed by: FAMILY MEDICINE

## 2019-10-04 PROCEDURE — 1036F TOBACCO NON-USER: CPT | Performed by: FAMILY MEDICINE

## 2019-10-04 PROCEDURE — G8400 PT W/DXA NO RESULTS DOC: HCPCS | Performed by: FAMILY MEDICINE

## 2019-10-04 PROCEDURE — 90653 IIV ADJUVANT VACCINE IM: CPT | Performed by: FAMILY MEDICINE

## 2019-10-04 RX ORDER — PRAVASTATIN SODIUM 80 MG/1
80 TABLET ORAL NIGHTLY
Qty: 90 TABLET | Refills: 3 | Status: SHIPPED | OUTPATIENT
Start: 2019-10-04

## 2019-10-16 ASSESSMENT — ENCOUNTER SYMPTOMS
ABDOMINAL PAIN: 0
COUGH: 0
DIARRHEA: 0
CHEST TIGHTNESS: 0
SHORTNESS OF BREATH: 0
CONSTIPATION: 0
NAUSEA: 0
ANAL BLEEDING: 0

## 2019-10-18 ENCOUNTER — TELEPHONE (OUTPATIENT)
Dept: NEUROLOGY | Age: 84
End: 2019-10-18

## 2020-01-01 ENCOUNTER — HOSPITAL ENCOUNTER (INPATIENT)
Age: 85
LOS: 1 days | Discharge: HOSPICE | DRG: 640 | End: 2020-10-02
Attending: EMERGENCY MEDICINE | Admitting: FAMILY MEDICINE
Payer: MEDICARE

## 2020-01-01 ENCOUNTER — OFFICE VISIT (OUTPATIENT)
Dept: FAMILY MEDICINE CLINIC | Age: 85
End: 2020-01-01
Payer: MEDICARE

## 2020-01-01 ENCOUNTER — APPOINTMENT (OUTPATIENT)
Dept: CT IMAGING | Age: 85
DRG: 640 | End: 2020-01-01
Payer: MEDICARE

## 2020-01-01 ENCOUNTER — OFFICE VISIT (OUTPATIENT)
Dept: NEUROLOGY | Age: 85
End: 2020-01-01
Payer: MEDICARE

## 2020-01-01 ENCOUNTER — APPOINTMENT (OUTPATIENT)
Dept: GENERAL RADIOLOGY | Age: 85
DRG: 640 | End: 2020-01-01
Payer: MEDICARE

## 2020-01-01 ENCOUNTER — TELEMEDICINE (OUTPATIENT)
Dept: FAMILY MEDICINE CLINIC | Age: 85
End: 2020-01-01
Payer: MEDICARE

## 2020-01-01 ENCOUNTER — TELEPHONE (OUTPATIENT)
Dept: INTERNAL MEDICINE | Age: 85
End: 2020-01-01

## 2020-01-01 ENCOUNTER — PATIENT MESSAGE (OUTPATIENT)
Dept: FAMILY MEDICINE CLINIC | Age: 85
End: 2020-01-01

## 2020-01-01 VITALS
HEART RATE: 60 BPM | TEMPERATURE: 97.7 F | OXYGEN SATURATION: 98 % | SYSTOLIC BLOOD PRESSURE: 116 MMHG | DIASTOLIC BLOOD PRESSURE: 60 MMHG

## 2020-01-01 VITALS
TEMPERATURE: 97.6 F | HEART RATE: 84 BPM | DIASTOLIC BLOOD PRESSURE: 65 MMHG | HEIGHT: 63 IN | BODY MASS INDEX: 27.28 KG/M2 | OXYGEN SATURATION: 96 % | SYSTOLIC BLOOD PRESSURE: 122 MMHG | RESPIRATION RATE: 16 BRPM

## 2020-01-01 VITALS
SYSTOLIC BLOOD PRESSURE: 120 MMHG | DIASTOLIC BLOOD PRESSURE: 64 MMHG | HEART RATE: 82 BPM | HEIGHT: 63 IN | WEIGHT: 154 LBS | BODY MASS INDEX: 27.29 KG/M2

## 2020-01-01 VITALS
DIASTOLIC BLOOD PRESSURE: 56 MMHG | OXYGEN SATURATION: 97 % | SYSTOLIC BLOOD PRESSURE: 104 MMHG | HEART RATE: 76 BPM | TEMPERATURE: 97.4 F

## 2020-01-01 LAB
ALBUMIN SERPL-MCNC: 3.5 G/DL (ref 3.5–5.2)
ALP BLD-CCNC: 65 U/L (ref 35–104)
ALT SERPL-CCNC: 39 U/L (ref 5–33)
ANION GAP SERPL CALCULATED.3IONS-SCNC: 10 MMOL/L (ref 7–19)
ANION GAP SERPL CALCULATED.3IONS-SCNC: 12 MMOL/L (ref 7–19)
ANION GAP SERPL CALCULATED.3IONS-SCNC: 15 MMOL/L (ref 7–19)
AST SERPL-CCNC: 37 U/L (ref 5–32)
BACTERIA: NEGATIVE /HPF
BASOPHILS ABSOLUTE: 0.1 K/UL (ref 0–0.2)
BASOPHILS RELATIVE PERCENT: 0.3 % (ref 0–1)
BILIRUB SERPL-MCNC: 0.5 MG/DL (ref 0.2–1.2)
BILIRUBIN URINE: NEGATIVE
BLOOD, URINE: ABNORMAL
BUN BLDV-MCNC: 18 MG/DL (ref 8–23)
BUN BLDV-MCNC: 30 MG/DL (ref 8–23)
BUN BLDV-MCNC: 44 MG/DL (ref 8–23)
CALCIUM SERPL-MCNC: 8.3 MG/DL (ref 8.8–10.2)
CALCIUM SERPL-MCNC: 8.5 MG/DL (ref 8.8–10.2)
CALCIUM SERPL-MCNC: 9.6 MG/DL (ref 8.8–10.2)
CHLORIDE BLD-SCNC: 107 MMOL/L (ref 98–111)
CHLORIDE BLD-SCNC: 112 MMOL/L (ref 98–111)
CHLORIDE BLD-SCNC: 115 MMOL/L (ref 98–111)
CLARITY: CLEAR
CO2: 23 MMOL/L (ref 22–29)
CO2: 23 MMOL/L (ref 22–29)
CO2: 28 MMOL/L (ref 22–29)
COLOR: YELLOW
CREAT SERPL-MCNC: 0.6 MG/DL (ref 0.5–0.9)
CREAT SERPL-MCNC: 0.6 MG/DL (ref 0.5–0.9)
CREAT SERPL-MCNC: 1 MG/DL (ref 0.5–0.9)
CRYSTALS, UA: ABNORMAL /HPF
EKG P AXIS: 57 DEGREES
EKG P-R INTERVAL: 136 MS
EKG Q-T INTERVAL: 332 MS
EKG QRS DURATION: 74 MS
EKG QTC CALCULATION (BAZETT): 388 MS
EKG T AXIS: -13 DEGREES
EOSINOPHILS ABSOLUTE: 0 K/UL (ref 0–0.6)
EOSINOPHILS RELATIVE PERCENT: 0 % (ref 0–5)
EPITHELIAL CELLS, UA: 1 /HPF (ref 0–5)
GFR AFRICAN AMERICAN: >59
GFR NON-AFRICAN AMERICAN: 53
GFR NON-AFRICAN AMERICAN: >60
GFR NON-AFRICAN AMERICAN: >60
GLUCOSE BLD-MCNC: 110 MG/DL (ref 74–109)
GLUCOSE BLD-MCNC: 153 MG/DL (ref 74–109)
GLUCOSE BLD-MCNC: 83 MG/DL (ref 74–109)
GLUCOSE URINE: 100 MG/DL
HCT VFR BLD CALC: 37.3 % (ref 37–47)
HCT VFR BLD CALC: 40.3 % (ref 37–47)
HCT VFR BLD CALC: 47.3 % (ref 37–47)
HEMOGLOBIN: 11.7 G/DL (ref 12–16)
HEMOGLOBIN: 12.4 G/DL (ref 12–16)
HEMOGLOBIN: 14.7 G/DL (ref 12–16)
HYALINE CASTS: 3 /HPF (ref 0–8)
IMMATURE GRANULOCYTES #: 0.1 K/UL
KETONES, URINE: NEGATIVE MG/DL
LACTIC ACID: 4.6 MMOL/L (ref 0.5–1.9)
LEUKOCYTE ESTERASE, URINE: NEGATIVE
LYMPHOCYTES ABSOLUTE: 0.9 K/UL (ref 1.1–4.5)
LYMPHOCYTES RELATIVE PERCENT: 5.4 % (ref 20–40)
MCH RBC QN AUTO: 29.5 PG (ref 27–31)
MCH RBC QN AUTO: 29.7 PG (ref 27–31)
MCH RBC QN AUTO: 29.9 PG (ref 27–31)
MCHC RBC AUTO-ENTMCNC: 30.8 G/DL (ref 33–37)
MCHC RBC AUTO-ENTMCNC: 31.1 G/DL (ref 33–37)
MCHC RBC AUTO-ENTMCNC: 31.4 G/DL (ref 33–37)
MCV RBC AUTO: 94.7 FL (ref 81–99)
MCV RBC AUTO: 96 FL (ref 81–99)
MCV RBC AUTO: 96.1 FL (ref 81–99)
MONOCYTES ABSOLUTE: 1.1 K/UL (ref 0–0.9)
MONOCYTES RELATIVE PERCENT: 6.6 % (ref 0–10)
NEUTROPHILS ABSOLUTE: 15 K/UL (ref 1.5–7.5)
NEUTROPHILS RELATIVE PERCENT: 87.2 % (ref 50–65)
NITRITE, URINE: NEGATIVE
PDW BLD-RTO: 13.1 % (ref 11.5–14.5)
PDW BLD-RTO: 13.3 % (ref 11.5–14.5)
PDW BLD-RTO: 13.4 % (ref 11.5–14.5)
PH UA: 5.5 (ref 5–8)
PLATELET # BLD: 243 K/UL (ref 130–400)
PLATELET # BLD: 248 K/UL (ref 130–400)
PLATELET # BLD: 354 K/UL (ref 130–400)
PMV BLD AUTO: 10 FL (ref 9.4–12.3)
PMV BLD AUTO: 9.9 FL (ref 9.4–12.3)
PMV BLD AUTO: 9.9 FL (ref 9.4–12.3)
POTASSIUM REFLEX MAGNESIUM: 3.8 MMOL/L (ref 3.5–5)
POTASSIUM REFLEX MAGNESIUM: 4 MMOL/L (ref 3.5–5)
POTASSIUM REFLEX MAGNESIUM: 4.8 MMOL/L (ref 3.5–5)
PROTEIN UA: ABNORMAL MG/DL
RBC # BLD: 3.94 M/UL (ref 4.2–5.4)
RBC # BLD: 4.2 M/UL (ref 4.2–5.4)
RBC # BLD: 4.92 M/UL (ref 4.2–5.4)
RBC UA: 6 /HPF (ref 0–4)
REASON FOR REJECTION: NORMAL
REJECTED TEST: NORMAL
SARS-COV-2, PCR: NOT DETECTED
SODIUM BLD-SCNC: 147 MMOL/L (ref 136–145)
SODIUM BLD-SCNC: 148 MMOL/L (ref 136–145)
SODIUM BLD-SCNC: 150 MMOL/L (ref 136–145)
SPECIFIC GRAVITY UA: 1.02 (ref 1–1.03)
TOTAL PROTEIN: 7.5 G/DL (ref 6.6–8.7)
UROBILINOGEN, URINE: 1 E.U./DL
WBC # BLD: 10 K/UL (ref 4.8–10.8)
WBC # BLD: 11.1 K/UL (ref 4.8–10.8)
WBC # BLD: 17.2 K/UL (ref 4.8–10.8)
WBC UA: 2 /HPF (ref 0–5)

## 2020-01-01 PROCEDURE — 85025 COMPLETE CBC W/AUTO DIFF WBC: CPT

## 2020-01-01 PROCEDURE — 2580000003 HC RX 258: Performed by: FAMILY MEDICINE

## 2020-01-01 PROCEDURE — 4040F PNEUMOC VAC/ADMIN/RCVD: CPT | Performed by: FAMILY MEDICINE

## 2020-01-01 PROCEDURE — G8417 CALC BMI ABV UP PARAM F/U: HCPCS | Performed by: FAMILY MEDICINE

## 2020-01-01 PROCEDURE — G8427 DOCREV CUR MEDS BY ELIG CLIN: HCPCS | Performed by: CLINICAL NURSE SPECIALIST

## 2020-01-01 PROCEDURE — 1090F PRES/ABSN URINE INCON ASSESS: CPT | Performed by: FAMILY MEDICINE

## 2020-01-01 PROCEDURE — 1123F ACP DISCUSS/DSCN MKR DOCD: CPT | Performed by: FAMILY MEDICINE

## 2020-01-01 PROCEDURE — G0378 HOSPITAL OBSERVATION PER HR: HCPCS

## 2020-01-01 PROCEDURE — 80048 BASIC METABOLIC PNL TOTAL CA: CPT

## 2020-01-01 PROCEDURE — 1210000000 HC MED SURG R&B

## 2020-01-01 PROCEDURE — 36415 COLL VENOUS BLD VENIPUNCTURE: CPT

## 2020-01-01 PROCEDURE — G8428 CUR MEDS NOT DOCUMENT: HCPCS | Performed by: FAMILY MEDICINE

## 2020-01-01 PROCEDURE — G8427 DOCREV CUR MEDS BY ELIG CLIN: HCPCS | Performed by: FAMILY MEDICINE

## 2020-01-01 PROCEDURE — 99285 EMERGENCY DEPT VISIT HI MDM: CPT

## 2020-01-01 PROCEDURE — 99214 OFFICE O/P EST MOD 30 MIN: CPT | Performed by: FAMILY MEDICINE

## 2020-01-01 PROCEDURE — G8400 PT W/DXA NO RESULTS DOC: HCPCS | Performed by: FAMILY MEDICINE

## 2020-01-01 PROCEDURE — G8417 CALC BMI ABV UP PARAM F/U: HCPCS | Performed by: CLINICAL NURSE SPECIALIST

## 2020-01-01 PROCEDURE — 4040F PNEUMOC VAC/ADMIN/RCVD: CPT | Performed by: CLINICAL NURSE SPECIALIST

## 2020-01-01 PROCEDURE — 81001 URINALYSIS AUTO W/SCOPE: CPT

## 2020-01-01 PROCEDURE — 1090F PRES/ABSN URINE INCON ASSESS: CPT | Performed by: CLINICAL NURSE SPECIALIST

## 2020-01-01 PROCEDURE — 4040F PNEUMOC VAC/ADMIN/RCVD: CPT | Performed by: PSYCHIATRY & NEUROLOGY

## 2020-01-01 PROCEDURE — G8427 DOCREV CUR MEDS BY ELIG CLIN: HCPCS | Performed by: PSYCHIATRY & NEUROLOGY

## 2020-01-01 PROCEDURE — 85027 COMPLETE CBC AUTOMATED: CPT

## 2020-01-01 PROCEDURE — 1090F PRES/ABSN URINE INCON ASSESS: CPT | Performed by: PSYCHIATRY & NEUROLOGY

## 2020-01-01 PROCEDURE — 92522 EVALUATE SPEECH PRODUCTION: CPT

## 2020-01-01 PROCEDURE — 99497 ADVNCD CARE PLAN 30 MIN: CPT | Performed by: NURSE PRACTITIONER

## 2020-01-01 PROCEDURE — 2580000003 HC RX 258: Performed by: EMERGENCY MEDICINE

## 2020-01-01 PROCEDURE — 72125 CT NECK SPINE W/O DYE: CPT

## 2020-01-01 PROCEDURE — 96360 HYDRATION IV INFUSION INIT: CPT

## 2020-01-01 PROCEDURE — 70450 CT HEAD/BRAIN W/O DYE: CPT

## 2020-01-01 PROCEDURE — 96361 HYDRATE IV INFUSION ADD-ON: CPT

## 2020-01-01 PROCEDURE — 99214 OFFICE O/P EST MOD 30 MIN: CPT | Performed by: CLINICAL NURSE SPECIALIST

## 2020-01-01 PROCEDURE — 1123F ACP DISCUSS/DSCN MKR DOCD: CPT | Performed by: PSYCHIATRY & NEUROLOGY

## 2020-01-01 PROCEDURE — 1036F TOBACCO NON-USER: CPT | Performed by: PSYCHIATRY & NEUROLOGY

## 2020-01-01 PROCEDURE — 96372 THER/PROPH/DIAG INJ SC/IM: CPT

## 2020-01-01 PROCEDURE — G0439 PPPS, SUBSEQ VISIT: HCPCS | Performed by: FAMILY MEDICINE

## 2020-01-01 PROCEDURE — G8400 PT W/DXA NO RESULTS DOC: HCPCS | Performed by: CLINICAL NURSE SPECIALIST

## 2020-01-01 PROCEDURE — 1036F TOBACCO NON-USER: CPT | Performed by: CLINICAL NURSE SPECIALIST

## 2020-01-01 PROCEDURE — 99214 OFFICE O/P EST MOD 30 MIN: CPT | Performed by: PSYCHIATRY & NEUROLOGY

## 2020-01-01 PROCEDURE — G8400 PT W/DXA NO RESULTS DOC: HCPCS | Performed by: PSYCHIATRY & NEUROLOGY

## 2020-01-01 PROCEDURE — 1123F ACP DISCUSS/DSCN MKR DOCD: CPT | Performed by: CLINICAL NURSE SPECIALIST

## 2020-01-01 PROCEDURE — 83605 ASSAY OF LACTIC ACID: CPT

## 2020-01-01 PROCEDURE — 92507 TX SP LANG VOICE COMM INDIV: CPT

## 2020-01-01 PROCEDURE — 92610 EVALUATE SWALLOWING FUNCTION: CPT

## 2020-01-01 PROCEDURE — 6360000002 HC RX W HCPCS: Performed by: FAMILY MEDICINE

## 2020-01-01 PROCEDURE — U0003 INFECTIOUS AGENT DETECTION BY NUCLEIC ACID (DNA OR RNA); SEVERE ACUTE RESPIRATORY SYNDROME CORONAVIRUS 2 (SARS-COV-2) (CORONAVIRUS DISEASE [COVID-19]), AMPLIFIED PROBE TECHNIQUE, MAKING USE OF HIGH THROUGHPUT TECHNOLOGIES AS DESCRIBED BY CMS-2020-01-R: HCPCS

## 2020-01-01 PROCEDURE — G8482 FLU IMMUNIZE ORDER/ADMIN: HCPCS | Performed by: PSYCHIATRY & NEUROLOGY

## 2020-01-01 PROCEDURE — 1036F TOBACCO NON-USER: CPT | Performed by: FAMILY MEDICINE

## 2020-01-01 PROCEDURE — 71045 X-RAY EXAM CHEST 1 VIEW: CPT

## 2020-01-01 PROCEDURE — 99219 PR INITIAL OBSERVATION CARE/DAY 50 MINUTES: CPT | Performed by: NURSE PRACTITIONER

## 2020-01-01 PROCEDURE — 93005 ELECTROCARDIOGRAM TRACING: CPT | Performed by: EMERGENCY MEDICINE

## 2020-01-01 PROCEDURE — 99232 SBSQ HOSP IP/OBS MODERATE 35: CPT | Performed by: NURSE PRACTITIONER

## 2020-01-01 PROCEDURE — 92526 ORAL FUNCTION THERAPY: CPT

## 2020-01-01 PROCEDURE — G8417 CALC BMI ABV UP PARAM F/U: HCPCS | Performed by: PSYCHIATRY & NEUROLOGY

## 2020-01-01 PROCEDURE — 80053 COMPREHEN METABOLIC PANEL: CPT

## 2020-01-01 PROCEDURE — 99999 PR OFFICE/OUTPT VISIT,PROCEDURE ONLY: CPT | Performed by: EMERGENCY MEDICINE

## 2020-01-01 RX ORDER — MEMANTINE HYDROCHLORIDE 5 MG/1
10 TABLET ORAL 2 TIMES DAILY
Status: DISCONTINUED | OUTPATIENT
Start: 2020-01-01 | End: 2020-01-01 | Stop reason: HOSPADM

## 2020-01-01 RX ORDER — PROMETHAZINE HYDROCHLORIDE 12.5 MG/1
12.5 TABLET ORAL EVERY 6 HOURS PRN
Status: DISCONTINUED | OUTPATIENT
Start: 2020-01-01 | End: 2020-01-01 | Stop reason: HOSPADM

## 2020-01-01 RX ORDER — DONEPEZIL HYDROCHLORIDE 10 MG/1
TABLET, FILM COATED ORAL
Qty: 30 TABLET | Refills: 0 | Status: SHIPPED | OUTPATIENT
Start: 2020-01-01 | End: 2020-01-01 | Stop reason: SDUPTHER

## 2020-01-01 RX ORDER — ASPIRIN 81 MG/1
81 TABLET ORAL DAILY
Status: DISCONTINUED | OUTPATIENT
Start: 2020-01-01 | End: 2020-01-01 | Stop reason: HOSPADM

## 2020-01-01 RX ORDER — MEMANTINE HYDROCHLORIDE 10 MG/1
TABLET ORAL
Qty: 180 TABLET | Refills: 3 | Status: SHIPPED | OUTPATIENT
Start: 2020-01-01

## 2020-01-01 RX ORDER — HYDROCODONE BITARTRATE AND ACETAMINOPHEN 5; 325 MG/1; MG/1
1 TABLET ORAL EVERY 8 HOURS PRN
Qty: 9 TABLET | Refills: 0 | Status: SHIPPED | OUTPATIENT
Start: 2020-01-01 | End: 2020-01-01

## 2020-01-01 RX ORDER — LISINOPRIL 10 MG/1
10 TABLET ORAL DAILY
Status: DISCONTINUED | OUTPATIENT
Start: 2020-01-01 | End: 2020-01-01 | Stop reason: HOSPADM

## 2020-01-01 RX ORDER — SODIUM CHLORIDE 0.9 % (FLUSH) 0.9 %
10 SYRINGE (ML) INJECTION PRN
Status: DISCONTINUED | OUTPATIENT
Start: 2020-01-01 | End: 2020-01-01 | Stop reason: HOSPADM

## 2020-01-01 RX ORDER — PRAVASTATIN SODIUM 20 MG
80 TABLET ORAL NIGHTLY
Status: DISCONTINUED | OUTPATIENT
Start: 2020-01-01 | End: 2020-01-01 | Stop reason: HOSPADM

## 2020-01-01 RX ORDER — QUETIAPINE FUMARATE 25 MG/1
12.5 TABLET, FILM COATED ORAL DAILY
Qty: 30 TABLET | Refills: 3 | Status: SHIPPED | OUTPATIENT
Start: 2020-01-01 | End: 2020-01-01 | Stop reason: SINTOL

## 2020-01-01 RX ORDER — SODIUM CHLORIDE 0.9 % (FLUSH) 0.9 %
10 SYRINGE (ML) INJECTION EVERY 12 HOURS SCHEDULED
Status: DISCONTINUED | OUTPATIENT
Start: 2020-01-01 | End: 2020-01-01 | Stop reason: HOSPADM

## 2020-01-01 RX ORDER — ONDANSETRON 2 MG/ML
4 INJECTION INTRAMUSCULAR; INTRAVENOUS EVERY 6 HOURS PRN
Status: DISCONTINUED | OUTPATIENT
Start: 2020-01-01 | End: 2020-01-01 | Stop reason: HOSPADM

## 2020-01-01 RX ORDER — ACETAMINOPHEN 650 MG/1
650 SUPPOSITORY RECTAL EVERY 6 HOURS PRN
Status: DISCONTINUED | OUTPATIENT
Start: 2020-01-01 | End: 2020-01-01 | Stop reason: HOSPADM

## 2020-01-01 RX ORDER — ACETAMINOPHEN 325 MG/1
650 TABLET ORAL EVERY 6 HOURS PRN
Status: DISCONTINUED | OUTPATIENT
Start: 2020-01-01 | End: 2020-01-01 | Stop reason: HOSPADM

## 2020-01-01 RX ORDER — DONEPEZIL HYDROCHLORIDE 5 MG/1
10 TABLET, FILM COATED ORAL NIGHTLY
Status: DISCONTINUED | OUTPATIENT
Start: 2020-01-01 | End: 2020-01-01 | Stop reason: HOSPADM

## 2020-01-01 RX ORDER — SODIUM CHLORIDE 9 MG/ML
1000 INJECTION, SOLUTION INTRAVENOUS CONTINUOUS
Status: DISCONTINUED | OUTPATIENT
Start: 2020-01-01 | End: 2020-01-01 | Stop reason: HOSPADM

## 2020-01-01 RX ORDER — HYDROCODONE BITARTRATE AND ACETAMINOPHEN 7.5; 325 MG/1; MG/1
1 TABLET ORAL EVERY 6 HOURS PRN
COMMUNITY
End: 2020-01-01

## 2020-01-01 RX ORDER — DONEPEZIL HYDROCHLORIDE 10 MG/1
TABLET, FILM COATED ORAL
Qty: 90 TABLET | Refills: 3 | Status: SHIPPED | OUTPATIENT
Start: 2020-01-01

## 2020-01-01 RX ORDER — LANOLIN ALCOHOL/MO/W.PET/CERES
3 CREAM (GRAM) TOPICAL NIGHTLY PRN
Status: DISCONTINUED | OUTPATIENT
Start: 2020-01-01 | End: 2020-01-01 | Stop reason: HOSPADM

## 2020-01-01 RX ADMIN — ENOXAPARIN SODIUM 40 MG: 40 INJECTION SUBCUTANEOUS at 17:52

## 2020-01-01 RX ADMIN — ENOXAPARIN SODIUM 40 MG: 40 INJECTION SUBCUTANEOUS at 21:45

## 2020-01-01 RX ADMIN — SODIUM CHLORIDE 1000 ML: 9 INJECTION, SOLUTION INTRAVENOUS at 08:32

## 2020-01-01 RX ADMIN — SODIUM CHLORIDE 1000 ML: 9 INJECTION, SOLUTION INTRAVENOUS at 16:31

## 2020-01-01 RX ADMIN — SODIUM CHLORIDE 1000 ML: 9 INJECTION, SOLUTION INTRAVENOUS at 17:14

## 2020-01-01 ASSESSMENT — ENCOUNTER SYMPTOMS
COUGH: 0
CHEST TIGHTNESS: 0
ANAL BLEEDING: 0
DIARRHEA: 0
CONSTIPATION: 0
SHORTNESS OF BREATH: 0
VOMITING: 0
NAUSEA: 0
ABDOMINAL PAIN: 0

## 2020-01-01 ASSESSMENT — PATIENT HEALTH QUESTIONNAIRE - PHQ9
SUM OF ALL RESPONSES TO PHQ QUESTIONS 1-9: 2
SUM OF ALL RESPONSES TO PHQ QUESTIONS 1-9: 2

## 2020-01-01 ASSESSMENT — PAIN SCALES - GENERAL
PAINLEVEL_OUTOF10: 0

## 2020-01-01 ASSESSMENT — LIFESTYLE VARIABLES: HOW OFTEN DO YOU HAVE A DRINK CONTAINING ALCOHOL: 0

## 2020-02-15 NOTE — PROGRESS NOTES
REVIEW OF SYSTEMS    Constitutional: []Fever []Sweats []Chills [] Recent Injury   [x] Denies all unless marked  HENT:[]Headache  [] Head Injury  [] Sore Throat  [] Ear Pain  [] Dizziness [] Hearing Loss   [x] Denies all unless marked  Spine:  [] Neck pain  [] Back pain  [] Sciaticia  [x] Denies all unless marked  Cardiovascular:[]Chest Pain []Palpitations [] Heart Disease  [x] Denies all unless marked  Pulmonary: []Shortness of Breath []Cough   [x] Denies all unless marked  Gastrointestinal:  []Abdominal Pain  []Blood in Stool  []Diarrhea []Constipation []Nausea  []Vomiting  [x] Denies all unless marked  Genitourinary:  [] Dysuria [] Frequency  [] Incontinence [] Urgency   [x] Denies all unless marked  Musculoskeletal: [] Arthralgia  [] Myalgias [] Muscle cramps  [] Muscle twitches   [x] Denies all unless marked   Extremities:   [] Pain   [] Swelling   [x] Denies all unless marked  Skin:[] Rash  [] Color Change  [x] Denies all unless marked  Neurological:[] Visual Disturbance [] Double Vision [] Slurred Speech [] Trouble swallowing  [] Vertigo [] Tingling [] Numbness [] Weakness [] Loss of Balance   [] Loss of Consciousness [x] Memory Loss [] Seizures  [] Denies all unless marked  Psychiatric/Behavioral:[] Depression [] Anxiety  [x] Denies all unless marked  Sleep: []  Insomnia [] Sleep Disturbance [] Snoring [] Restless Legs [] Daytime Sleepiness [] Sleep Apnea  [x] Denies all unless marked
Palate elevates in midline  CN XI-good shoulder shrug  CN XII-Tongue midline with no fasciculations or fibrillations    Motor Exam  V/V throughout upper and lower extremities bilaterally, there is some cogwheeling and bradykinesia bilaterally. Reflexes   2+ biceps bilaterally  2+ brachioradialis  2+ triceps  2+patella  2+ ankle jerks      Tremors- no tremors in hands or head noted    Gait  Decreased arm swing. Turns en bloc. Has festination while walking through the doorways. Coordination  Finger to nose and TIM-unremarkable    Lab Results   Component Value Date    HOCUNKRC60 355 02/19/2018     Lab Results   Component Value Date    WBC 6.9 02/19/2018    HGB 14.2 02/19/2018    HCT 44.8 02/19/2018    MCV 94.7 02/19/2018     02/19/2018     Lab Results   Component Value Date     02/19/2018    K 4.2 02/19/2018    CL 99 02/19/2018    CO2 29 02/19/2018    BUN 14 02/19/2018    CREATININE 0.7 02/19/2018    GLUCOSE 83 02/19/2018    CALCIUM 9.7 02/19/2018    PROT 7.5 02/19/2018    LABALBU 4.3 02/19/2018    BILITOT 0.6 02/19/2018    ALKPHOS 51 02/19/2018    AST 20 02/19/2018    ALT 19 02/19/2018    LABGLOM >60 02/19/2018           Assessment    ICD-10-CM    1. Memory loss R41.3    2. Ataxia R27.0    3. Parkinson disease ([facility] [address] G20    4. Abnormal MRI of head R93.0      Patient would appear to have a moderate to moderately severe dementia. Presumably due to Alzheimer's disease with some secondary parkinsonian features. No evidence of NPH by her MRI. She will be placed on a trial of Sinemet for her Parkinson's. Hopefully that will help with her mobility. Continue on Aricept/Namenda for dementia. Plan          Return in about 4 weeks (around 3/11/2020).     (Please note that portions of this note were completed with a voice recognition program. Efforts were made to edit the dictations but occasionally words are mis-transcribed.)

## 2020-04-17 NOTE — PROGRESS NOTES
due to the coronavirus pandemic  Chronic issues stable    Return in about 3 months (around 7/17/2020) for Routine follow up - 15 minute visit. Daughter is  Alexx Matamoros is a 82 y.o. female being evaluated by a Virtual Visit (video visit) encounter to address concerns as mentioned above. A caregiver was present when appropriate. Due to this being a TeleHealth encounter (During [facility]- public health emergency), evaluation of the following organ systems was limited: Vitals/Constitutional/EENT/Resp/CV/GI//MS/Neuro/Skin/Heme-Lymph-Imm. Pursuant to the emergency declaration under the [address] [place] [address], [address] [place] [address] authority and the [employer] Act, this Virtual Visit was conducted with patient's (and/or legal guardian's) consent, to reduce the patient's risk of exposure to COVID-19 and provide necessary medical care. The patient (and/or legal guardian) has also been advised to contact this office for worsening conditions or problems, and seek emergency medical treatment and/or call 911 if deemed necessary. Services were provided through a video synchronous discussion virtually to substitute for in-person clinic visit. Patient and provider were located at their individual homes. --Shelly Weston MD on 4/20/2020 at 4:49 PM    An electronic signature was used to authenticate this note.

## 2020-07-20 NOTE — PROGRESS NOTES
Medicare Annual Wellness Visit - Subsequent    Name: Jb Martínez Date: 2020   MRN: [id number] Sex: Female   Age: 82 y.o. Ethnicity: Non-/Non    : 1935 Race: Atiya Lozano is here for   Chief Complaint   Patient presents with   [address] for behavioral, psychosocial and functional/safety risks, and cognitive dysfunction are all negative except as indicated below. These results, as well as other patient data from the [address] form, are documented in Flowsheets linked to this Encounter. No Known Allergies    Prior to Visit Medications    Medication Sig Taking? Authorizing Provider   donepezil (ARICEPT) 10 MG tablet TAKE 1 TABLET BY MOUTH ONCE DAILY AT NIGHT Yes Alex Mack MD   melatonin 3 MG TABS tablet Take 3 mg by mouth nightly as needed Yes Historical Provider, MD   pravastatin (PRAVACHOL) 80 MG tablet Take 1 tablet by mouth nightly Yes Alex Mack MD   lisinopril (PRINIVIL;ZESTRIL) 10 MG tablet TAKE 1 TABLET BY MOUTH ONCE DAILY IN THE MORNING Yes Alex Mack MD   memantine (NAMENDA) 10 MG tablet TAKE 1 TABLET BY MOUTH TWICE DAILY Yes Alex Mack MD   Cholecalciferol (VITAMIN D) 2000 units CAPS capsule Take by mouth Yes Historical Provider, MD   aspirin 81 MG tablet Take 81 mg by mouth daily Yes Historical Provider, MD   carbidopa-levodopa (SINEMET)  MG per tablet Take 1/2 pill each am and afternoon for a week, then 1 each am and afternoon  Joe Mcrae MD   zoster recombinant adjuvanted vaccine [facility]) 50 MCG/0.5ML SUSR injection 1 vaccine now and repeat in 2-6 months.   Patient not taking: Reported on 2020  Alex Mack MD       Past Medical History:   Diagnosis Date    DVT (deep venous thrombosis) ([address]     left lower extremity       Past Surgical History:   Procedure Laterality Date    COLONOSCOPY  2012       Family History   Problem Relation Age of Onset    Colon Cancer Daughter        CareTeam (Including outside providers/suppliers regularly involved in providing care):   Patient Care Team:  José Miguel Wesley MD as PCP - General (Family Medicine)  José Miguel Wesley MD as PCP - [facility] Empaneled Provider    Wt Readings from Last 3 Encounters:   02/12/20 154 lb (69.9 kg)   12/16/19 154 lb (69.9 kg)   10/04/19 151 lb (68.5 kg)     Vitals:    07/20/20 1502   BP: (!) 104/56   Pulse: 76   Temp: 97.4 °F (36.3 °C)   SpO2: 97%           The following problems were reviewed today and where indicated follow up appointments were made and/or referrals ordered. Risk Factor Screenings with Interventions     Fall Risk:  2 or more falls in past year?: (!) yes  Fall with injury in past year?: no  Fall Risk Interventions:    · Provided home safety tips handout  ·     Depression:  PHQ-2 Score: 2  Depression Interventions:  · Not indicated     Anxiety:     Anxiety Interventions:  · Not indicated     Cognitive:     Cognitive Impairment Interventions:  · She has Alzheimer's dementia    Substance Abuse:  Social History     Socioeconomic History    Marital status:       Spouse name: Not on file    Number of children: Not on file    Years of education: Not on file    Highest education level: Not on file   Occupational History    Not on file   Social Needs    Financial resource strain: Not on file    Food insecurity     Worry: Not on file     Inability: Not on file    Transportation needs     Medical: Not on file     Non-medical: Not on file   Tobacco Use    Smoking status: Never Smoker    Smokeless tobacco: Never Used   Substance and Sexual Activity    Alcohol use: No    Drug use: No    Sexual activity: Not Currently   Lifestyle    Physical activity     Days per week: Not on file     Minutes per session: Not on file    Stress: Not on file   Relationships    Social connections     Talks on phone: Not on file     Gets together: Not on file     Attends Oriental orthodox service: Not on file Active member of club or organization: Not on file     Attends meetings of clubs or organizations: Not on file     Relationship status: Not on file    Intimate partner violence     Fear of current or ex partner: Not on file     Emotionally abused: Not on file     Physically abused: Not on file     Forced sexual activity: Not on file   Other Topics Concern    Not on file   Social History Narrative    Not on file     Audit Questionnaire: Screen for Alcohol Misuse  How often do you have a drink containing alcohol?: Never  Substance Abuse Interventions:  · Not indicated     Health Risk Assessment:     General  In general, how would you say your health is?: Fair  In the past 7 days, have you experienced any of the following? New or Increased Pain, New or Increased Fatigue, Loneliness, Social Isolation, Stress or Anger?: None of These  Do you get the social and emotional support that you need?: Yes  Do you have a Living Will?: Yes  General Health Risk Interventions:  · Not indicated     Health Habits/Nutrition  Do you exercise for at least 20 minutes 2-3 times per week?: (!) No  Have you lost any weight without trying in the past 3 months?: No  Do you eat fewer than 2 meals per day?: No  Have you seen a dentist within the past year?: (!) No  There is no height or weight on file to calculate BMI. Health Habits/Nutrition Interventions:  Recommended at least 150 minutes of exercise per week and resistance training 2 days a week. Discussed healthy diet habits. Offered suggestions for calorie counting.   ·   ·     Hearing/Vision  Do you or your family notice any trouble with your hearing?: (!) Yes  Do you have difficulty driving, watching TV, or doing any of your daily activities because of your eyesight?: No  Have you had an eye exam within the past year?: (!) No  Hearing/Vision Interventions:  · Not indicated     Safety  Do you have working smoke detectors?: Yes  Have all throw rugs been removed or fastened?: Yes  Do you have non-slip mats or surfaces in all bathtubs/showers?: Yes  Do all of your stairways have a railing or banister?: Yes  Are your doorways, halls and stairs free of clutter?: Yes  Do you always fasten your seatbelt when you are in a car?: Yes  Safety Interventions:  · Not indicated     ADLs  In the past 7 days, did you need help from others to perform any of the following everyday activities? Eating, dressing, grooming, bathing, toileting, or walking/balance?: (!) Walking/Balance, Toileting, Grooming, Eating, Bathing, Dressing  In the past 7 days, did you need help from others to take care of any of the following?  Laundry, housekeeping, banking/finances, shopping, telephone use, food preparation, transportation, or taking medications?: (!) Laundry, Housekeeping, Banking/Finances, Shopping, Telephone Use, Food Preparation, Transportation, Taking Medications  ADL Interventions:  · Provided home safety tips handout  ·     Personalized Preventive Plan   Current Health Maintenance Status  Immunization History   Administered Date(s) Administered    Influenza, High Dose (Fluzone 65 yrs and older) 10/08/2001, 11/07/2001, 11/01/2002, 11/10/2012, 10/14/2014, 10/15/2015, 10/20/2016, 10/28/2017, 12/31/2018    Influenza, Triv, inactivated, subunit, adjuvanted, IM (Fluad 65 yrs and older) 10/04/2019    Pneumococcal Conjugate 13-valent ([id number]) 04/14/2015    Pneumococcal Polysaccharide ([id number]) 04/23/2013    Td (Adult), 5 Lf Tetanus Toxoid, Pf (Tenivac, Decavac) 06/23/2018    Td, unspecified formulation 06/18/2015        Health Maintenance   Topic Date Due    DTaP/Tdap/Td vaccine (1 - Tdap) 05/24/1956    Shingles Vaccine (1 of 2) 05/24/1987    DEXA (modify frequency per FRAX score)  05/24/1992    Colon cancer screen colonoscopy  05/24/2012    Lipid screen  02/19/2019    TSH testing  02/19/2019    Potassium monitoring  02/19/2019    Creatinine monitoring  02/19/2019    Annual Wellness Visit (AWV)  05/29/2019  Flu vaccine (1) 09/01/2020    Pneumococcal 65+ years Vaccine  Completed    Hepatitis A vaccine  Aged Out    Hepatitis B vaccine  Aged Out    Hib vaccine  Aged Out    Meningococcal (ACWY) vaccine  Aged Out       Recommendations for [employer] Due: see orders.   Recommended screening schedule for the next 5-10 years is provided to the patient in written form: see Patient Instructions/AVS.

## 2020-08-18 ENCOUNTER — APPOINTMENT (OUTPATIENT)
Dept: CT IMAGING | Facility: HOSPITAL | Age: 85
End: 2020-08-18

## 2020-08-18 ENCOUNTER — HOSPITAL ENCOUNTER (EMERGENCY)
Facility: HOSPITAL | Age: 85
Discharge: HOME | End: 2020-08-18
Attending: EMERGENCY MEDICINE | Admitting: EMERGENCY MEDICINE

## 2020-08-18 ENCOUNTER — APPOINTMENT (OUTPATIENT)
Dept: ULTRASOUND IMAGING | Facility: HOSPITAL | Age: 85
End: 2020-08-18

## 2020-08-18 VITALS
BODY MASS INDEX: 28.52 KG/M2 | OXYGEN SATURATION: 99 % | WEIGHT: 155 LBS | HEART RATE: 85 BPM | TEMPERATURE: 98 F | HEIGHT: 62 IN | RESPIRATION RATE: 18 BRPM | DIASTOLIC BLOOD PRESSURE: 70 MMHG | SYSTOLIC BLOOD PRESSURE: 135 MMHG

## 2020-08-18 DIAGNOSIS — S22.018A OTHER CLOSED FRACTURE OF FIRST THORACIC VERTEBRA, INITIAL ENCOUNTER (HCC): ICD-10-CM
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
## 2020-08-18 DIAGNOSIS — S32.030A CLOSED WEDGE COMPRESSION FRACTURE OF THIRD LUMBAR VERTEBRA, INITIAL ENCOUNTER: ICD-10-CM

## 2020-08-18 DIAGNOSIS — S12.100A CLOSED ODONTOID FRACTURE, INITIAL ENCOUNTER (HCC): ICD-10-CM

## 2020-08-18 LAB
ALBUMIN SERPL-MCNC: 4 G/DL (ref 3.5–5.2)
ALBUMIN/GLOB SERPL: 1.5 G/DL
ALP SERPL-CCNC: 47 U/L (ref 39–117)
ALT SERPL W P-5'-P-CCNC: 19 U/L (ref 1–33)
ANION GAP SERPL CALCULATED.3IONS-SCNC: 11 MMOL/L (ref 5–15)
AST SERPL-CCNC: 28 U/L (ref 1–32)
BASOPHILS # BLD AUTO: 0.03 10*3/MM3 (ref 0–0.2)
BASOPHILS NFR BLD AUTO: 0.4 % (ref 0–1.5)
BILIRUB SERPL-MCNC: 0.4 MG/DL (ref 0–1.2)
BUN SERPL-MCNC: 19 MG/DL (ref 8–23)
BUN/CREAT SERPL: 26.8 (ref 7–25)
CALCIUM SPEC-SCNC: 9.3 MG/DL (ref 8.6–10.5)
CHLORIDE SERPL-SCNC: 104 MMOL/L (ref 98–107)
CO2 SERPL-SCNC: 25 MMOL/L (ref 22–29)
CREAT SERPL-MCNC: 0.71 MG/DL (ref 0.57–1)
DEPRECATED RDW RBC AUTO: 42.6 FL (ref 37–54)
EOSINOPHIL # BLD AUTO: 0.09 10*3/MM3 (ref 0–0.4)
EOSINOPHIL NFR BLD AUTO: 1.1 % (ref 0.3–6.2)
ERYTHROCYTE [DISTWIDTH] IN BLOOD BY AUTOMATED COUNT: 12.9 % (ref 12.3–15.4)
GFR SERPL CREATININE-BSD FRML MDRD: 79 ML/MIN/1.73
GLOBULIN UR ELPH-MCNC: 2.7 GM/DL
GLUCOSE SERPL-MCNC: 103 MG/DL (ref 65–99)
HCT VFR BLD AUTO: 40.4 % (ref 34–46.6)
HGB BLD-MCNC: 13.1 G/DL (ref 12–15.9)
IMM GRANULOCYTES # BLD AUTO: 0.03 10*3/MM3 (ref 0–0.05)
IMM GRANULOCYTES NFR BLD AUTO: 0.4 % (ref 0–0.5)
LYMPHOCYTES # BLD AUTO: 1.27 10*3/MM3 (ref 0.7–3.1)
LYMPHOCYTES NFR BLD AUTO: 16.2 % (ref 19.6–45.3)
MCH RBC QN AUTO: 29.2 PG (ref 26.6–33)
MCHC RBC AUTO-ENTMCNC: 32.4 G/DL (ref 31.5–35.7)
MCV RBC AUTO: 90.2 FL (ref 79–97)
MONOCYTES # BLD AUTO: 0.53 10*3/MM3 (ref 0.1–0.9)
MONOCYTES NFR BLD AUTO: 6.7 % (ref 5–12)
NEUTROPHILS NFR BLD AUTO: 5.91 10*3/MM3 (ref 1.7–7)
NEUTROPHILS NFR BLD AUTO: 75.2 % (ref 42.7–76)
NRBC BLD AUTO-RTO: 0 /100 WBC (ref 0–0.2)
PLATELET # BLD AUTO: 198 10*3/MM3 (ref 140–450)
PMV BLD AUTO: 9.4 FL (ref 6–12)
POTASSIUM SERPL-SCNC: 4.4 MMOL/L (ref 3.5–5.2)
PROT SERPL-MCNC: 6.7 G/DL (ref 6–8.5)
RBC # BLD AUTO: 4.48 10*6/MM3 (ref 3.77–5.28)
SODIUM SERPL-SCNC: 140 MMOL/L (ref 136–145)
WBC # BLD AUTO: 7.86 10*3/MM3 (ref 3.4–10.8)

## 2020-08-18 PROCEDURE — 72131 CT LUMBAR SPINE W/O DYE: CPT

## 2020-08-18 PROCEDURE — 72128 CT CHEST SPINE W/O DYE: CPT

## 2020-08-18 PROCEDURE — 72125 CT NECK SPINE W/O DYE: CPT

## 2020-08-18 PROCEDURE — 80053 COMPREHEN METABOLIC PANEL: CPT | Performed by: EMERGENCY MEDICINE

## 2020-08-18 PROCEDURE — 93971 EXTREMITY STUDY: CPT | Performed by: SURGERY

## 2020-08-18 PROCEDURE — 70450 CT HEAD/BRAIN W/O DYE: CPT

## 2020-08-18 PROCEDURE — 93971 EXTREMITY STUDY: CPT

## 2020-08-18 PROCEDURE — 99284 EMERGENCY DEPT VISIT MOD MDM: CPT

## 2020-08-18 PROCEDURE — 85025 COMPLETE CBC W/AUTO DIFF WBC: CPT | Performed by: EMERGENCY MEDICINE

## 2020-08-18 NOTE — ED PROVIDER NOTES
Subjective   Patient is an 85-year-old female who presents to the ER status post a fall.  Patient's caregiver stepped out of the room this morning and when she came back shortly thereafter, she found the patient lying in the floor.  Patient states she slipped out of her chair and hit her head.  Loss of consciousness was unknown.  Patient has complained of a severe headache, neck pain and low back pain since the fall.  She does take aspirin but denies any other blood thinner use.  Patient's daughter states the patient's left lower extremity has been edematous recently but is better today.  She was concerned she might have a blood clot.  She denies any fever, chest pain, shortness of breath, abdominal pain, nausea vomiting diarrhea, urinary changes, neurologic changes.  Patient also denies any extremity pain.          Review of Systems   Constitutional: Negative.    Eyes: Negative.    Respiratory: Negative.    Cardiovascular: Positive for leg swelling.   Gastrointestinal: Negative.    Endocrine: Negative.    Genitourinary: Negative.    Musculoskeletal: Positive for back pain and neck pain.   Skin: Negative.    Allergic/Immunologic: Negative.    Neurological: Positive for headaches.   Hematological: Negative.    Psychiatric/Behavioral: Negative.    All other systems reviewed and are negative.      Past Medical History:   Diagnosis Date   • Dementia    • Hypertension        No Known Allergies    No past surgical history on file.    No family history on file.    Social History     Socioeconomic History   • Marital status:      Spouse name: Not on file   • Number of children: Not on file   • Years of education: Not on file   • Highest education level: Not on file   Tobacco Use   • Smoking status: Former Smoker   Substance and Sexual Activity   • Alcohol use: No   • Drug use: No   • Sexual activity: Defer           Objective   Physical Exam   Constitutional: She is oriented to person, place, and time. She appears  well-developed and well-nourished.   HENT:   Head: Normocephalic and atraumatic.   Eyes: Pupils are equal, round, and reactive to light. Conjunctivae are normal.   Neck: Normal range of motion.   Cardiovascular: Normal rate, regular rhythm and normal heart sounds.   Pulmonary/Chest: Effort normal and breath sounds normal.   Abdominal: Soft. There is no tenderness.   Musculoskeletal: Normal range of motion. She exhibits no edema or deformity.   Tender to palpation C-spine, nontender to palpation elsewhere including T and L spines and extremities, normal range of motion, neurovascularly intact   Neurological: She is alert and oriented to person, place, and time. She has normal strength. No cranial nerve deficit or sensory deficit.   Skin: Skin is warm.   No edema appreciated to the bilateral lower extremities, no calf TTP   Psychiatric: She has a normal mood and affect. Her behavior is normal.   Nursing note and vitals reviewed.      Procedures           ED Course      Although the patient had no loss of consciousness, due to advanced age and severe headache, a CT scan of the head was performed.     Lab Results (last 24 hours)     Procedure Component Value Units Date/Time    CBC & Differential [[id number]] Collected:  08/18/20 1407    Specimen:  Blood Updated:  08/18/20 1415    Narrative:       The following orders were created for panel order CBC & Differential.  Procedure                               Abnormality         Status                     ---------                               -----------         ------                     CBC Auto Differential[[id number]]        Abnormal            Final result                 Please view results for these tests on the individual orders.    Comprehensive Metabolic Panel [[id number]]  (Abnormal) Collected:  08/18/20 1407    Specimen:  Blood Updated:  08/18/20 1431     Glucose 103 mg/dL      BUN 19 mg/dL      Creatinine 0.71 mg/dL      Sodium 140 mmol/L      Potassium 4.4  mmol/L      Chloride 104 mmol/L      CO2 25.0 mmol/L      Calcium 9.3 mg/dL      Total Protein 6.7 g/dL      Albumin 4.00 g/dL      ALT (SGPT) 19 U/L      AST (SGOT) 28 U/L      Alkaline Phosphatase 47 U/L      Total Bilirubin 0.4 mg/dL      eGFR Non African Amer 79 mL/min/1.73      Globulin 2.7 gm/dL      A/G Ratio 1.5 g/dL      BUN/Creatinine Ratio 26.8     Anion Gap 11.0 mmol/L     Narrative:       GFR Normal >60  Chronic Kidney Disease <60  Kidney Failure <15      CBC Auto Differential [[id number]]  (Abnormal) Collected:  08/18/20 1407    Specimen:  Blood Updated:  08/18/20 1415     WBC 7.86 10*3/mm3      RBC 4.48 10*6/mm3      Hemoglobin 13.1 g/dL      Hematocrit 40.4 %      MCV 90.2 fL      MCH 29.2 pg      MCHC 32.4 g/dL      RDW 12.9 %      RDW-SD 42.6 fl      MPV 9.4 fL      Platelets 198 10*3/mm3      Neutrophil % 75.2 %      Lymphocyte % 16.2 %      Monocyte % 6.7 %      Eosinophil % 1.1 %      Basophil % 0.4 %      Immature Grans % 0.4 %      Neutrophils, Absolute 5.91 10*3/mm3      Lymphocytes, Absolute 1.27 10*3/mm3      Monocytes, Absolute 0.53 10*3/mm3      Eosinophils, Absolute 0.09 10*3/mm3      Basophils, Absolute 0.03 10*3/mm3      Immature Grans, Absolute 0.03 10*3/mm3      nRBC 0.0 /100 WBC         CT Head Without Contrast   Final Result   No acute intracranial findings. Sequela of chronic microvascular   ischemia.       This report was finalized on 08/18/2020 13:32 by Dr. Percy Gerard MD.      CT Cervical Spine Without Contrast   Final Result   1. Acute minimally displaced type III odontoid fracture with extension   into the left transverse process at this level. CTA neck is recommended   for evaluation of the vertebral artery.       2. Possible superior endplate deformity at T1, though evaluation is   limited due to artifact. Correlation for point tenderness recommended.       Findings were called to Dr. Costello in the emergency department at 1:38 PM   on 8/18/2020       This report was  finalized on 08/18/2020 13:41 by Dr. Percy Gerard MD.      CT Thoracic Spine Without Contrast   Final Result   1. No acute fracture or malalignment of the thoracic spine.    This report was finalized on 08/18/2020 13:50 by Dr Lainey Reyes MD.      CT Lumbar Spine Without Contrast   Final Result   1. Progressive height loss of the L3 vertebral body, with approximately   40% height loss on the current exam. Acute on chronic compression   deformity not excluded. Correlation for point tenderness recommended.       This report was finalized on 08/18/2020 13:49 by Dr. Percy Gerard MD.      US Venous Doppler Lower Extremity Left (duplex)   Final Result   Impression: There is no evidence of deep venous thrombosis or   superficial thrombophlebitis of the left lower extremity.       Comments: Left lower extremity venous duplex exam was performed using   color Doppler flow, Doppler wave form analysis, and grayscale imaging,   with and without compression. There is no evidence of deep venous   thrombosis of the common femoral, superficial femoral, popliteal,   posterior tibial, and peroneal veins. There is no thrombus identified in   the saphenofemoral junction or the greater saphenous vein. There is no   evidence of clot in the right common femoral vein.       This report was finalized on 08/18/2020 13:46 by Dr. Thierno Chappell MD.        Labs are negative.  Ultrasound of the left lower extremity was negative for DVT.  CT scan of the L-spine showed progressive height loss of the L3 vertebral body with a 40% height loss concerning for acute on chronic compression deformity.  Patient also had a acute minimally displaced type III odontoid fracture of the neck.  There was also a possible superior endplate deformity at T1.  CT scan of the head was unremarkable.  I discussed these findings with Dr. Brenner with neurosurgery.  He did not feel a CTA of the neck was warranted.  He recommended placing the patient in a c-collar  and an LSO brace and having the patient follow-up in his clinic in 2 weeks.  Patient and daughter were agreeable to this plan.  Braces were placed and patient tolerated well.  She is to return to the ER immediately for any worsening or new pain, neurologic changes or other concerns.                                MDM    Final diagnoses:   Fall, initial encounter   Closed odontoid fracture, initial encounter (CMS/[facility])   Other closed fracture of first thoracic vertebra, initial encounter (CMS/[facility])   Closed wedge compression fracture of third lumbar vertebra, initial encounter (CMS/[facility])            Lainey Costello MD  08/18/20 2201

## 2020-08-20 ENCOUNTER — NURSE TRIAGE (OUTPATIENT)
Dept: CALL CENTER | Facility: HOSPITAL | Age: 85
End: 2020-08-20

## 2020-08-20 PROCEDURE — 99283 EMERGENCY DEPT VISIT LOW MDM: CPT

## 2020-08-20 RX ORDER — SODIUM CHLORIDE 0.9 % (FLUSH) 0.9 %
10 SYRINGE (ML) INJECTION AS NEEDED
Status: DISCONTINUED | OUTPATIENT
Start: 2020-08-20 | End: 2020-08-21 | Stop reason: HOSPADM

## 2020-08-21 ENCOUNTER — HOSPITAL ENCOUNTER (EMERGENCY)
Facility: HOSPITAL | Age: 85
Discharge: HOME | End: 2020-08-21
Attending: EMERGENCY MEDICINE | Admitting: EMERGENCY MEDICINE

## 2020-08-21 ENCOUNTER — APPOINTMENT (OUTPATIENT)
Dept: CT IMAGING | Facility: HOSPITAL | Age: 85
End: 2020-08-21

## 2020-08-21 VITALS
WEIGHT: 155 LBS | OXYGEN SATURATION: 96 % | SYSTOLIC BLOOD PRESSURE: 123 MMHG | RESPIRATION RATE: 17 BRPM | DIASTOLIC BLOOD PRESSURE: 86 MMHG | HEIGHT: 63 IN | BODY MASS INDEX: 27.46 KG/M2 | TEMPERATURE: 97.5 F | HEART RATE: 77 BPM

## 2020-08-21 DIAGNOSIS — R51.9 NONINTRACTABLE HEADACHE, UNSPECIFIED CHRONICITY PATTERN, UNSPECIFIED HEADACHE TYPE: Primary | ICD-10-CM

## 2020-08-21 DIAGNOSIS — E04.1 THYROID NODULE: ICD-10-CM

## 2020-08-21 LAB
ALBUMIN SERPL-MCNC: 3.9 G/DL (ref 3.5–5.2)
ALBUMIN/GLOB SERPL: 1.4 G/DL
ALP SERPL-CCNC: 45 U/L (ref 39–117)
ALT SERPL W P-5'-P-CCNC: 13 U/L (ref 1–33)
ANION GAP SERPL CALCULATED.3IONS-SCNC: 11 MMOL/L (ref 5–15)
AST SERPL-CCNC: 17 U/L (ref 1–32)
BASOPHILS # BLD AUTO: 0.04 10*3/MM3 (ref 0–0.2)
BASOPHILS NFR BLD AUTO: 0.5 % (ref 0–1.5)
BILIRUB SERPL-MCNC: 0.3 MG/DL (ref 0–1.2)
BUN SERPL-MCNC: 13 MG/DL (ref 8–23)
BUN/CREAT SERPL: 19.7 (ref 7–25)
CALCIUM SPEC-SCNC: 9.5 MG/DL (ref 8.6–10.5)
CHLORIDE SERPL-SCNC: 105 MMOL/L (ref 98–107)
CO2 SERPL-SCNC: 26 MMOL/L (ref 22–29)
CREAT SERPL-MCNC: 0.66 MG/DL (ref 0.57–1)
DEPRECATED RDW RBC AUTO: 41.8 FL (ref 37–54)
EOSINOPHIL # BLD AUTO: 0.16 10*3/MM3 (ref 0–0.4)
EOSINOPHIL NFR BLD AUTO: 1.8 % (ref 0.3–6.2)
ERYTHROCYTE [DISTWIDTH] IN BLOOD BY AUTOMATED COUNT: 12.7 % (ref 12.3–15.4)
GFR SERPL CREATININE-BSD FRML MDRD: 86 ML/MIN/1.73
GLOBULIN UR ELPH-MCNC: 2.7 GM/DL
GLUCOSE SERPL-MCNC: 128 MG/DL (ref 65–99)
HCT VFR BLD AUTO: 40.3 % (ref 34–46.6)
HGB BLD-MCNC: 13.3 G/DL (ref 12–15.9)
HOLD SPECIMEN: NORMAL
HOLD SPECIMEN: NORMAL
IMM GRANULOCYTES # BLD AUTO: 0.04 10*3/MM3 (ref 0–0.05)
IMM GRANULOCYTES NFR BLD AUTO: 0.5 % (ref 0–0.5)
LYMPHOCYTES # BLD AUTO: 1.76 10*3/MM3 (ref 0.7–3.1)
LYMPHOCYTES NFR BLD AUTO: 20 % (ref 19.6–45.3)
MCH RBC QN AUTO: 29.8 PG (ref 26.6–33)
MCHC RBC AUTO-ENTMCNC: 33 G/DL (ref 31.5–35.7)
MCV RBC AUTO: 90.2 FL (ref 79–97)
MONOCYTES # BLD AUTO: 0.65 10*3/MM3 (ref 0.1–0.9)
MONOCYTES NFR BLD AUTO: 7.4 % (ref 5–12)
NEUTROPHILS NFR BLD AUTO: 6.13 10*3/MM3 (ref 1.7–7)
NEUTROPHILS NFR BLD AUTO: 69.8 % (ref 42.7–76)
NRBC BLD AUTO-RTO: 0 /100 WBC (ref 0–0.2)
PLATELET # BLD AUTO: 212 10*3/MM3 (ref 140–450)
PMV BLD AUTO: 9.7 FL (ref 6–12)
POTASSIUM SERPL-SCNC: 4 MMOL/L (ref 3.5–5.2)
PROT SERPL-MCNC: 6.6 G/DL (ref 6–8.5)
RBC # BLD AUTO: 4.47 10*6/MM3 (ref 3.77–5.28)
SODIUM SERPL-SCNC: 142 MMOL/L (ref 136–145)
WBC # BLD AUTO: 8.78 10*3/MM3 (ref 3.4–10.8)
WHOLE BLOOD HOLD SPECIMEN: NORMAL
WHOLE BLOOD HOLD SPECIMEN: NORMAL

## 2020-08-21 PROCEDURE — 70450 CT HEAD/BRAIN W/O DYE: CPT

## 2020-08-21 PROCEDURE — 70498 CT ANGIOGRAPHY NECK: CPT

## 2020-08-21 PROCEDURE — 70496 CT ANGIOGRAPHY HEAD: CPT

## 2020-08-21 PROCEDURE — 0 IOPAMIDOL PER 1 ML: Performed by: EMERGENCY MEDICINE

## 2020-08-21 PROCEDURE — 36415 COLL VENOUS BLD VENIPUNCTURE: CPT

## 2020-08-21 PROCEDURE — 25010000002 ONDANSETRON PER 1 MG: Performed by: EMERGENCY MEDICINE

## 2020-08-21 PROCEDURE — 85025 COMPLETE CBC W/AUTO DIFF WBC: CPT | Performed by: EMERGENCY MEDICINE

## 2020-08-21 PROCEDURE — 96374 THER/PROPH/DIAG INJ IV PUSH: CPT

## 2020-08-21 PROCEDURE — 25010000002 MORPHINE PER 10 MG: Performed by: EMERGENCY MEDICINE

## 2020-08-21 PROCEDURE — 80053 COMPREHEN METABOLIC PANEL: CPT | Performed by: EMERGENCY MEDICINE

## 2020-08-21 PROCEDURE — 96375 TX/PRO/DX INJ NEW DRUG ADDON: CPT

## 2020-08-21 RX ORDER — ONDANSETRON 2 MG/ML
4 INJECTION INTRAMUSCULAR; INTRAVENOUS ONCE
Status: COMPLETED | OUTPATIENT
Start: 2020-08-21 | End: 2020-08-21

## 2020-08-21 RX ORDER — MORPHINE SULFATE 10 MG/ML
6 INJECTION INTRAMUSCULAR; INTRAVENOUS; SUBCUTANEOUS ONCE
Status: COMPLETED | OUTPATIENT
Start: 2020-08-21 | End: 2020-08-21

## 2020-08-21 RX ADMIN — ONDANSETRON HYDROCHLORIDE 4 MG: 2 SOLUTION INTRAMUSCULAR; INTRAVENOUS at 01:14

## 2020-08-21 RX ADMIN — MORPHINE SULFATE 6 MG: 10 INJECTION, SOLUTION INTRAMUSCULAR; INTRAVENOUS at 01:14

## 2020-08-21 RX ADMIN — IOPAMIDOL 100 ML: 755 INJECTION, SOLUTION INTRAVENOUS at 01:55

## 2020-08-21 NOTE — TELEPHONE ENCOUNTER
"    Reason for Disposition  • [1] SEVERE headache (e.g., excruciating) AND [2] \"worst headache\" of life    Additional Information  • Negative: Difficult to awaken or acting confused (e.g., disoriented, slurred speech)  • Negative: [1] Weakness of the face, arm or leg on one side of the body AND [2] new onset  • Negative: [1] Numbness of the face, arm or leg on one side of the body AND [2] new onset  • Negative: [1] Loss of speech or garbled speech AND [2] new onset  • Negative: Passed out (i.e., lost consciousness, collapsed and was not responding)  • Negative: Sounds like a life-threatening emergency to the triager  • Negative: Followed a head injury  • Negative: Pregnant  • Negative: Postpartum (from 0 to 6 weeks after delivery)  • Negative: Traumatic Brain Injury (TBI) is suspected  • Negative: Unable to walk, or can only walk with assistance (e.g., requires support)  • Negative: Stiff neck (can't touch chin to chest)  • Negative: Severe pain in one eye  • Negative: [1] Other family members (or roommates) with headaches AND [2] possibility of carbon monoxide exposure    Answer Assessment - Initial Assessment Questions  1. LOCATION: \"Where does it hurt?\"       All over   2. ONSET: \"When did the headache start?\" (Minutes, hours or days)       Couple days   3. PATTERN: \"Does the pain come and go, or has it been constant since it started?\"      Constant, but worse at times   4. SEVERITY: \"How bad is the pain?\" and \"What does it keep you from doing?\"  (e.g., Scale 1-10; mild, moderate, or severe)    - MILD (1-3): doesn't interfere with normal activities     - MODERATE (4-7): interferes with normal activities or awakens from sleep     - SEVERE (8-10): excruciating pain, unable to do any normal activities         Moderate to severe   5. RECURRENT SYMPTOM: \"Have you ever had headaches before?\" If so, ask: \"When was the last time?\" and \"What happened that time?\"       No   6. CAUSE: \"What do you think is causing the " "headache?\"      Had a fall a few days ago and hit her head. Now having headache   7. MIGRAINE: \"Have you been diagnosed with migraine headaches?\" If so, ask: \"Is this headache similar?\"       No   8. HEAD INJURY: \"Has there been any recent injury to the head?\"       Yes; Saturday   9. OTHER SYMPTOMS: \"Do you have any other symptoms?\" (fever, stiff neck, eye pain, sore throat, cold symptoms)      No   10. PREGNANCY: \"Is there any chance you are pregnant?\" \"When was your last menstrual period?\"        No    Protocols used: HEADACHE-ADULT-AH      "

## 2020-08-21 NOTE — ED PROVIDER NOTES
Subjective   86 y/o female who was seen here recently for a fall resulting in minimally displaced type III odontoid fracture, L3 veteebral body fracture as well as T1 who re-presents with daughter for evaluation of headaches. Patient has dementia and appears without any distress at this time denying all pain to me. Per her daughter, however, she will intermittently complain of headaches. DA has been giving her left over norco from a prior fracture as well as tylenol which appears to help. She denies any AMS or further falls.         Family, social and past history reviewed as below, prior documentation of H and Ps and other documentation are reviewed:    Past Medical History:  No date: Dementia (CMS/HCC)  No date: Hypertension    Past Surgical History:  No date: BACK SURGERY    Social History    Socioeconomic History      Marital status:       Spouse name: Not on file      Number of children: Not on file      Years of education: Not on file      Highest education level: Not on file    Tobacco Use      Smoking status: Former Smoker    Substance and Sexual Activity      Alcohol use: No      Drug use: No      Sexual activity: Defer      Family history: reviewed and noncontributory           Review of Systems   All other systems reviewed and are negative.      Past Medical History:   Diagnosis Date   • Dementia (CMS/HCC)    • Hypertension        No Known Allergies    Past Surgical History:   Procedure Laterality Date   • BACK SURGERY         History reviewed. No pertinent family history.    Social History     Socioeconomic History   • Marital status:      Spouse name: Not on file   • Number of children: Not on file   • Years of education: Not on file   • Highest education level: Not on file   Tobacco Use   • Smoking status: Former Smoker   Substance and Sexual Activity   • Alcohol use: No   • Drug use: No   • Sexual activity: Defer           Objective   Physical Exam   Constitutional: She appears  well-developed and well-nourished.   HENT:   Head: Normocephalic and atraumatic.   Eyes: Pupils are equal, round, and reactive to light. Conjunctivae and EOM are normal.   Neck:   In collar    Cardiovascular: Normal rate, regular rhythm, normal heart sounds and intact distal pulses.   Pulmonary/Chest: Effort normal and breath sounds normal.   Abdominal: Soft. Bowel sounds are normal.   Musculoskeletal: Normal range of motion. She exhibits no edema, tenderness or deformity.   Neurological: She is alert. No cranial nerve deficit or sensory deficit. She exhibits normal muscle tone.   No weakness, no deficits.    Skin: Skin is warm. Capillary refill takes less than 2 seconds.   Psychiatric: She has a normal mood and affect. Her behavior is normal.   Vitals reviewed.      Procedures           ED Course  ED Course as of Aug 21 0353   Fri Aug 21, 2020   0350 After morphine her headache did soham. Her CT, CTA were all read as no acute process. I do feel her pain is from her fracture. Her DA statse she has enough narcotics at home and will provide these as needed. At this time will dc to home.     []      ED Course User Index  [] Don Riggs MD            CT Angiogram Head    (Results Pending)   CT Angiogram Neck    (Results Pending)   CT Head Without Contrast    (Results Pending)     Labs Reviewed   COMPREHENSIVE METABOLIC PANEL - Abnormal; Notable for the following components:       Result Value    Glucose 128 (*)     All other components within normal limits    Narrative:     GFR Normal >60  Chronic Kidney Disease <60  Kidney Failure <15     CBC WITH AUTO DIFFERENTIAL - Normal   RAINBOW DRAW    Narrative:     The following orders were created for panel order [place] Draw.  Procedure                               Abnormality         Status                     ---------                               -----------         ------                     Light Blue Top[[id number]]                                   Final  result               Green Top (Gel)[[id number]]                                  Final result               Lavender Top[[id number]]                                     Final result               Red Top[[id number]]                                          Final result                 Please view results for these tests on the individual orders.   CBC AND DIFFERENTIAL    Narrative:     The following orders were created for panel order CBC & Differential.  Procedure                               Abnormality         Status                     ---------                               -----------         ------                     CBC Auto Differential[[id number]]        Normal              Final result                 Please view results for these tests on the individual orders.   LIGHT BLUE TOP   GREEN TOP   LAVENDER TOP   RED TOP                                       [facility]    Final diagnoses:   Nonintractable headache, unspecified chronicity pattern, unspecified headache type   Thyroid nodule            Don Riggs MD  08/21/20 035

## 2020-08-21 NOTE — DISCHARGE INSTRUCTIONS
I do feel that Jo Ann's headaches are likely from a combination of her broken bones and the collar she has to wear. Her CT scans today did not show any bleeding. They did show that her thyroid has a few nodules in it which she will need to see her family doctor for.

## 2020-08-24 ENCOUNTER — TELEPHONE (OUTPATIENT)
Dept: NEUROSURGERY | Facility: CLINIC | Age: 85
End: 2020-08-24

## 2020-08-24 NOTE — TELEPHONE ENCOUNTER
Tried to reach patient's daughter, Yeni Tavera, to schedule Jo Ann for an appointment, a referral from Lainey Costello for a fracture, had to leave message for a call back.

## 2020-08-25 ENCOUNTER — HOSPITAL ENCOUNTER (OUTPATIENT)
Dept: GENERAL RADIOLOGY | Facility: HOSPITAL | Age: 85
Discharge: HOME | End: 2020-08-25
Admitting: NURSE PRACTITIONER

## 2020-08-25 ENCOUNTER — OFFICE VISIT (OUTPATIENT)
Dept: NEUROSURGERY | Facility: CLINIC | Age: 85
End: 2020-08-25

## 2020-08-25 VITALS
BODY MASS INDEX: 27.46 KG/M2 | SYSTOLIC BLOOD PRESSURE: 108 MMHG | WEIGHT: 155 LBS | DIASTOLIC BLOOD PRESSURE: 88 MMHG | HEIGHT: 63 IN

## 2020-08-25 DIAGNOSIS — Z78.9 NON-SMOKER: ICD-10-CM

## 2020-08-25 DIAGNOSIS — S32.030A CLOSED COMPRESSION FRACTURE OF L3 LUMBAR VERTEBRA, INITIAL ENCOUNTER (HCC): ICD-10-CM

## 2020-08-25 DIAGNOSIS — E66.3 OVERWEIGHT WITH BODY MASS INDEX (BMI) OF 27 TO 27.9 IN ADULT: ICD-10-CM

## 2020-08-25 DIAGNOSIS — S12.120A CLOSED ODONTOID FRACTURE WITH TYPE III MORPHOLOGY, INITIAL ENCOUNTER (HCC): Primary | ICD-10-CM

## 2020-08-25 PROCEDURE — 99204 OFFICE O/P NEW MOD 45 MIN: CPT | Performed by: NURSE PRACTITIONER

## 2020-08-25 PROCEDURE — 72040 X-RAY EXAM NECK SPINE 2-3 VW: CPT

## 2020-08-25 NOTE — PROGRESS NOTES
Chief complaint:   Chief Complaint   Patient presents with   • Neck Pain     Jo Ann is referred here today for follow up for an odontoid fracture, she is here with her daughter and she is in a neck brace.         Subjective     HPI: This is an 85-year-old female patient who was referred to us by [facility] emergency room for an odontoid fracture and lumbar compression fracture.  She is here to be evaluated today.  The patient does have dementia.  She is accompanied by her daughter.  In talking to them today I said the patient fell a week ago.  She does spend a lot of time in a wheelchair.  They do have a caregiver that sits with her and the patient apparently fell but it is uncertain as to the nature of the fall if she fell out of the wheelchair versus if she stood up and fell.  The patient did complain of head pain after the fall and they did bring the patient into the emergency room where is found that she did have an odontoid fracture type III.  It was also noticed that she had a compression fracture in her lumbar spine however she did have this fracture from 2 years ago.  In [place] to the patient today she is not complaining of any headaches or neck pain.  Denies any arm pain or back pain.  She is able to move all extremities without difficulty.  The patient is oriented to person only.  She has been living with her daughter.  She is right-hand dominant.  She is retired.  She is .  Denies any tobacco, alcohol, or illicit drug use    Review of Systems   Constitutional: Positive for activity change, appetite change and diaphoresis.   HENT: Positive for congestion and postnasal drip.    Eyes: Positive for itching.   Cardiovascular: Positive for leg swelling.   Gastrointestinal: Positive for constipation.   Musculoskeletal: Positive for back pain, gait problem, neck pain and neck stiffness.   Neurological: Positive for dizziness, tremors, speech difficulty, weakness, light-headedness and headaches.    "  Psychiatric/Behavioral: Positive for confusion, decreased concentration and hallucinations. The patient is nervous/anxious.    All other systems reviewed and are negative.       Past Medical History:   Diagnosis Date   • Dementia (CMS/HCC)    • Hypertension      Past Surgical History:   Procedure Laterality Date   • BACK SURGERY       No family history on file.  Social History     Tobacco Use   • Smoking status: Former Smoker   • Smokeless tobacco: Never Used   Substance Use Topics   • Alcohol use: No   • Drug use: No       (Not in a hospital admission)  Allergies:  Patient has no known allergies.    Objective      Vital Signs  /88 (BP Location: Right arm, Patient Position: Sitting)   Ht 160 cm (63\")   Wt 70.3 kg (155 lb)   BMI 27.46 kg/m²     Physical Exam   Constitutional: She appears well-developed and well-nourished.   HENT:   Head: Normocephalic.   Eyes: Pupils are equal, round, and reactive to light. Conjunctivae, EOM and lids are normal.   Neck: Normal range of motion.   Cardiovascular: Normal rate and regular rhythm.   Pulmonary/Chest: Effort normal and breath sounds normal.   Abdominal: Normal appearance.   Musculoskeletal: Normal range of motion.   Neurological: She is alert. She has normal strength and normal reflexes. She displays normal reflexes. No cranial nerve deficit or sensory deficit. Gait abnormal. GCS eye subscore is 4. GCS verbal subscore is 5. GCS motor subscore is 6.   Skin: Skin is warm.   Psychiatric: She has a normal mood and affect. Her speech is normal and behavior is normal. Thought content normal. Cognition and memory are normal.       Neurologic Exam     Mental Status   Oriented to person.   Disoriented to place.   Disoriented to year and day.   Attention: decreased. Concentration: decreased.   Speech: speech is normal   Level of consciousness: alert ,  arousable by verbal stimuli  Abnormal comprehension.     Cranial Nerves     CN III, IV, VI   Pupils are equal, round, and " reactive to light.  Extraocular motions are normal.     Motor Exam     Strength   Strength 5/5 throughout.       Results Review: CT angiogram of the neck done here [facility] on August 18, 2020 does not reveal any significant common or internal carotid artery narrowing.  There is a fracture at the base of the odontoid however this is nondisplaced.  Disc degeneration is noted throughout the cervical spine.        CT scan of the lumbar spine done here [facility] on August 18, 2020 does demonstrate a L3 compression fracture that in comparison to a scan from 2018 does show increase in the compression deformity however it is uncertain if this is acute or chronic at this point.  Thoracic spine imaging was negative    Assessment/Plan: I did discuss this patient with Dr. Brenner.  We are going to have her go for set of x-rays in the collar today to make sure that there is no alignment issues.  We will see her back in the office in a few weeks and if the patient is doing good at that time repeat imaging of her neck out of the collar and also do x-rays of her lumbar spine to check on the compression fracture to see if it has changed any.  She was told to call us if she had any further problems or concerns.  Patient is a nonsmoker  The patient's BMI is Body mass index is 27.46 kg/m². today which shows the patient is Overweight: 25.0-29.9kg/m2 . BMI chart was given to the patient.   Advance Care Planning   ACP discussion was held with the patient during this visit. Patient does not have an advance directive, information provided.      Jo Ann was seen today for neck pain.    Diagnoses and all orders for this visit:    Closed odontoid fracture with type III morphology, initial encounter (CMS/[facility])  -     XR Spine Cervical Lateral    Closed compression fracture of L3 lumbar vertebra, initial encounter (CMS/[facility])    Overweight with body mass index (BMI) of 27 to 27.9 in adult    Non-smoker          I discussed the  patients findings and my recommendations with patient    Jorge Keys, APRN  08/25/20  13:31

## 2020-08-25 NOTE — PATIENT INSTRUCTIONS
"BMI for Adults    Body mass index (BMI) is a number that is calculated from a person's weight and height. BMI may help to estimate how much of a person's weight is composed of fat. BMI can help identify those who may be at higher risk for certain medical problems.  How is BMI used with adults?  BMI is used as a screening tool to identify possible weight problems. It is used to check whether a person is obese, overweight, healthy weight, or underweight.  How is BMI calculated?  BMI measures your weight and compares it to your height. This can be done either in English (U[place] or metric measurements. Note that charts are available to help you find your BMI quickly and easily without having to do these calculations yourself.  To calculate your BMI in English ([place] measurements, your health care provider will:  1. Measure your weight in pounds (lb).  2. Multiply the number of pounds by 703.  ? For example, for a person who weighs 180 lb, multiply that number by 703, which equals 126,540.  3. Measure your height in inches (in). Then multiply that number by itself to get a measurement called \"inches squared.\"  ? For example, for a person who is 70 in tall, the \"inches squared\" measurement is 70 in x 70 in, which equals 4900 inches squared.  4. Divide the total from Step 2 (number of lb x 703) by the total from Step 3 (inches squared): 126,540 ÷ 4900 = 25.8. This is your BMI.  To calculate your BMI in metric measurements, your health care provider will:  1. Measure your weight in kilograms (kg).  2. Measure your height in meters (m). Then multiply that number by itself to get a measurement called \"meters squared.\"  ? For example, for a person who is 1.75 m tall, the \"meters squared\" measurement is 1.75 m x 1.75 m, which is equal to 3.1 meters squared.  3. Divide the number of kilograms (your weight) by the meters squared number. In this example: 70 ÷ 3.1 = 22.6. This is your BMI.  How is BMI interpreted?  To interpret your " results, your health care provider will use BMI charts to identify whether you are underweight, normal weight, overweight, or obese. The following guidelines will be used:  · Underweight: BMI less than 18.5.  · Normal weight: BMI between 18.5 and 24.9.  · Overweight: BMI between 25 and 29.9.  · Obese: BMI of 30 and above.  Please note:  · Weight includes both fat and muscle, so someone with a muscular build, such as an athlete, may have a BMI that is higher than 24.9. In cases like these, BMI is not an accurate measure of body fat.  · To determine if excess body fat is the cause of a BMI of 25 or higher, further assessments may need to be done by a health care provider.  · BMI is usually interpreted in the same way for men and women.  Why is BMI a useful tool?  BMI is useful in two ways:  · Identifying a weight problem that may be related to a medical condition, or that may increase the risk for medical problems.  · Promoting lifestyle and diet changes in order to reach a healthy weight.  Summary  · Body mass index (BMI) is a number that is calculated from a person's weight and height.  · BMI may help to estimate how much of a person's weight is composed of fat. BMI can help identify those who may be at higher risk for certain medical problems.  · BMI can be measured using English measurements or metric measurements.  · To interpret your results, your health care provider will use BMI charts to identify whether you are underweight, normal weight, overweight, or obese.  This information is not intended to replace advice given to you by your health care provider. Make sure you discuss any questions you have with your health care provider.  Document Released: 08/29/2005 Document Revised: 11/30/2018 Document Reviewed: 10/31/2018  Elsevier Patient Education © 2020 [employer]      Advance Care Planning and Advance Directives     You make decisions on a daily basis - decisions about where you want to live, your career,  your home, your life. Perhaps one of the most important decisions you face is your choice for future medical care. Take time to talk with your family and your healthcare team and start planning today.  Advance Care Planning is a process that can help you:  · Understand possible future healthcare decisions in light of your own experiences  · Reflect on those decision in light of your goals and values  · Discuss your decisions with those closest to you and the healthcare professionals that care for you  · Make a plan by creating a document that reflects your wishes    Surrogate Decision Maker  In the event of a medical emergency, which has left you unable to communicate or to make your own decisions, you would need someone to make decisions for you.  It is important to discuss your preferences for medical treatment with this person while you are in good health.     Qualities of a surrogate decision maker:  • Willing to take on this role and responsibility  • Knows what you want for future medical care  • Willing to follow your wishes even if they don't agree with them  • Able to make difficult medical decisions under stressful circumstances    Advance Directives  These are legal documents you can create that will guide your healthcare team and decision maker(s) when needed. These documents can be stored in the electronic medical record.    · Living Will - a legal document to guide your care if you have a terminal condition or a serious illness and are unable to communicate. States vary by statute in document names/types, but most forms may include one or more of the following:        -  Directions regarding life-prolonging treatments        -  Directions regarding artificially provided nutrition/hydration        -  Choosing a healthcare decision maker        -  Direction regarding organ/tissue donation    · Durable Power of  for Healthcare - this document names an -in-fact to make medical decisions for  you, but it may also allow this person to make personal and financial decisions for you. Please seek the advice of an  if you need this type of document.    **Advance Directives are not required and no one may discriminate against you if you do not sign one.    Medical Orders  Many states allow specific forms/orders signed by your physician to record your wishes for medical treatment in your current state of health. This form, signed in personal communication with your physician, addresses resuscitation and other medical interventions that you may or may not want.      For more information or to schedule a time with a [facility] Advance Care Planning Facilitator contact: [email or web address] or call [phone] and someone will contact you directly.

## 2020-08-25 NOTE — TELEPHONE ENCOUNTER
From: Evan Cardona  To: Audi Hare MD  Sent: 8/25/2020 8:06 AM CDT  Subject: Non-Urgent Medical Question    Good Morning Starla Whittington,  I'm so sorry, on my request yesterday,  I didn't specify electric hospital bed. I wasn't sure what you put the order in for.    Leah Cedillo

## 2020-09-01 NOTE — PROGRESS NOTES
SUBJECTIVE:  Dieudonne Carter is a 82 y.o. who presents today for ED Follow-up      HPI     Ms Juan Rehman presents today for ED follow up. She presented to \[facility]"" ER on 8/18/20 after a fall. She was found to have odontoid fracture and lumbar compression fracture of L3. She returned on 8/20/20 due to worsening, intractable headache. She has had follow up with Neurosurgery on 8/25, imaging was stable. She remains in cervical collar and LSO bracing with plans to repeat imaging in 4 weeks. She is in need of rehab, hopefully short term due to her falling, generalized weakness and progressive neurocognitive decline. She is mostly sedentary at home, will stand for brief periods and can also walk with a walker with one assist short distances. She seems to be eating and drinking well. BM ok with miralax and dulcolax. Having to get up several times at night to void, using BSC. For pain, she is taking tylenol for mild pain but also taking Norco 1/2 of 7.5mg twice daily. This is effective without side effects. Admitting to rehab at [facility]. Past Medical History:   Diagnosis Date    DVT (deep venous thrombosis) (HCC)     left lower extremity     Past Surgical History:   Procedure Laterality Date    COLONOSCOPY  2012     Family History   Problem Relation Age of Onset    Colon Cancer Daughter      Social History     Tobacco Use    Smoking status: Never Smoker    Smokeless tobacco: Never Used   Substance Use Topics    Alcohol use: No     Current Outpatient Medications   Medication Sig Dispense Refill    HYDROcodone-acetaminophen (NORCO) 5-325 MG per tablet Take 1 tablet by mouth every 8 hours as needed for Pain for up to 3 days. Intended supply: 3 days. Take lowest dose possible to manage pain 9 tablet 0    Misc.  Devices [facility] hospital bed 1 Device 0    donepezil (ARICEPT) 10 MG tablet TAKE 1 TABLET BY MOUTH ONCE DAILY AT NIGHT 90 tablet 3    memantine (NAMENDA) 10 MG tablet TAKE 1 TABLET BY and pain. Skin:     General: Skin is warm and dry. Findings: No erythema or rash. Neurological:      Mental Status: She is alert. She is disoriented. Comments: Holding head at times on left due to pain   Psychiatric:         Mood and Affect: Mood is depressed. Thought Content: Thought content normal.         Cognition and Memory: Cognition is impaired. Memory is impaired. ASSESSMENT/PLAN:  1. Closed odontoid fracture with routine healing, subsequent encounter  - HYDROcodone-acetaminophen (NORCO) 5-325 MG per tablet; Take 1 tablet by mouth every 8 hours as needed for Pain for up to 3 days. Intended supply: 3 days. Take lowest dose possible to manage pain  Dispense: 9 tablet; Refill: 0    2. Compression fracture of L3 vertebra with routine healing, subsequent encounter    3. Major neurocognitive disorder ([facility] [address]    Admit to [address] [place] [address] for rehab for acute pain and deconditioning 2/2 advancing dementia and recent fall sustaining odontoid fracture and lumbar compression fracture. rx norco given to daughter today. Return for already scheduled.

## 2020-09-30 PROBLEM — S12.100D CLOSED ODONTOID FRACTURE WITH ROUTINE HEALING: Status: ACTIVE | Noted: 2020-01-01

## 2020-09-30 PROBLEM — E87.20 LACTIC ACIDOSIS: Status: ACTIVE | Noted: 2020-01-01

## 2020-09-30 PROBLEM — E86.0 DEHYDRATION: Status: ACTIVE | Noted: 2020-01-01

## 2020-09-30 PROBLEM — G93.41 METABOLIC ENCEPHALOPATHY: Status: ACTIVE | Noted: 2020-01-01

## 2020-09-30 PROBLEM — E87.0 HYPERNATREMIA: Status: ACTIVE | Noted: 2020-01-01

## 2020-09-30 PROBLEM — N17.9 ACUTE KIDNEY INJURY (HCC): Status: ACTIVE | Noted: 2020-01-01

## 2020-09-30 PROBLEM — R74.01 TRANSAMINITIS: Status: ACTIVE | Noted: 2020-01-01

## 2020-09-30 NOTE — ED NOTES
Patient placed in a gown  Patient placed on cardiac monitor, continuous pulse oximeter, and NIBP monitor. Monitor alarms on. Mask and gloves worn by staff while in pt room. Pt masked during all contact with staff.        Sandeep Reich RN  09/30/20 [id number]

## 2020-09-30 NOTE — PROGRESS NOTES
4 Eyes Skin Assessment    Casandra Bruner is being assessed upon: Admission    I agree that I, Meliton Sultana, along with Marisol Mathwe (either 2 RN's or 1 LPN and 1 RN) have performed a thorough Head to Toe Skin Assessment on the patient. ALL assessment sites listed below have been assessed. Areas assessed by both nurses:     [x]   Head, Face, and Ears   [x]   Shoulders, Back, and Chest  [x]   Arms, Elbows, and Hands   [x]   Coccyx, Sacrum, and Ischium  [x]   Legs, Feet, and Heels    Does the Patient have Skin Breakdown?  No    [place] Prevention initiated: Yes  Wound Care Orders initiated: No    WO nurse consulted for Pressure Injury (Stage 3,4, Unstageable, DTI, NWPT, and Complex wounds) and New or Established Ostomies: No        Primary Nurse eSignature: Meliton Sultana RN on 9/30/2020 at 6:18 PM      Co-Signer eSignature: {Esignature:[id number]}

## 2020-09-30 NOTE — ED NOTES
[facility] approved pt's daughter to spend the night.      David Howell RN  09/30/20 [id number]

## 2020-09-30 NOTE — PROGRESS NOTES
Tyra Branham arrived to room # 430. Presented with: Fatigue  Mental Status: Patient is disoriented and alert. Vitals:    09/30/20 1735   BP: (!) 100/56   Pulse: 91   Resp: 20   Temp: 98.9 °F (37.2 °C)   SpO2: 95%     Patient safety contract and falls prevention contract reviewed with patient Yes. Oriented Patient and Family to room. Call light within reach. Yes.   Needs, issues or concerns expressed at this time: no.      Electronically signed by Jacqueline Mohs, RN on 9/30/2020 at 6:18 PM

## 2020-09-30 NOTE — H&P
Hospital Medicine  History and Physical    Patient:  Antonella Christensen  MRN: [id number]    CHIEF COMPLAINT:  Altered mental status    History Obtained From: daughter, chart    PCP: Arturo Renee MD    HISTORY OF PRESENT ILLNESS:  82year old white female brought to the emergency department after staff at [facility] became concerned about her lack of responsiveness and neck hyperextension. She has been in [facility] since a few weeks after sustaining fracture of odontoid process due to fall. Her daughter attempted to care for her, but the patient is demented and her daughter did not feel she could care for her appropriately. A neurosurgeon at [facility] has been following the fracture but did not feel she needed any intervention. She was in a stiff cervical collar which has been traded in for a soft collar. Shortly after placement at [facility], the patient began to refuse the cervical collar and was noted to be drooping her neck throughout the day. She was scheduled to see the neurosurgeon yesterday, but [facility] was locked down due to two staff members testing positive for COVID-19 and thus she missed this appointment. She has been tested but was due for testing again tomorrow. She has had no fever, chills, cough, or shortness of breath. Today, the patient would not answer questions and was noted to have her neck stiffly hyperextended with some rhythmic movements that they feared were seizure activity. She has not been eating and drinking well. REVIEW OF SYSTEMS:  14 systems reviewed and negative except as noted above. Past Medical History:      Diagnosis Date    DVT (deep venous thrombosis) (HCC)     left lower extremity       Past Surgical History:      Procedure Laterality Date    COLONOSCOPY  2012       Medications Prior to Admission:    Prior to Admission medications    Medication Sig Start Date End Date Taking? Authorizing Provider   Misc.  Devices [facility] bed 8/25/20   Arturo Renee MD   donepezil (ARICEPT) 10 MG tablet TAKE 1 TABLET BY MOUTH ONCE DAILY AT NIGHT 7/20/20   Braeden Barraza MD   memantine (NAMENDA) 10 MG tablet TAKE 1 TABLET BY MOUTH TWICE DAILY 7/20/20   Braeden Barraza MD   melatonin 3 MG TABS tablet Take 3 mg by mouth nightly as needed    Historical Provider, MD   pravastatin (PRAVACHOL) 80 MG tablet Take 1 tablet by mouth nightly 10/4/19   Braeden Barraza MD   lisinopril (PRINIVIL;ZESTRIL) 10 MG tablet TAKE 1 TABLET BY MOUTH ONCE DAILY IN THE MORNING 9/16/19   Braeden Barraza MD   zoster recombinant adjuvanted vaccine [facility]) 50 MCG/0.5ML SUSR injection 1 vaccine now and repeat in 2-6 months. 2/18/19   Braeden Barraza MD   Cholecalciferol (VITAMIN D) 2000 units CAPS capsule Take by mouth    Historical Provider, MD   aspirin 81 MG tablet Take 81 mg by mouth daily    Historical Provider, MD       Allergies:  Patient has no known allergies. Social History:   TOBACCO:   reports that she has never smoked. She has never used smokeless tobacco.  ETOH:   reports no history of alcohol use.     Family History:       Problem Relation Age of Onset    Colon Cancer Daughter            Physical Exam:    Vitals: BP (!) 100/56   Pulse 91   Temp 98.9 °F (37.2 °C) (Temporal)   Resp 20   SpO2 95%   24HR INTAKE/OUTPUT:  No intake or output data in the 24 hours ending 09/30/20 1755  General appearance: alert and cooperative with exam  HEENT: cervical collar in place, atraumatic, eyes with clear conjunctiva and normal lids, pupils and irises normal, external ears and nose are normal, lips normal. Neck without masses, lympadenopathy, bruit, thyroid normal  Lungs: no increased work of breathing, diminished breath sounds bilaterally  Heart: regular rate and rhythm, S1, S2 normal, no murmur, click, rub or gallop  Abdomen: soft, non-tender; bowel sounds normal; no masses,  no organomegaly  Extremities: extremities normal without clubbing, atraumatic, no cyanosis or edema  Neurologic: normal sensation, alert but disoriented, flat affect  Skin: no rashes, nodules    CBC:   Recent Labs     09/30/20  1417   WBC 17.2*   HGB 14.7        BMP:    Recent Labs     09/30/20  1417   *   K 4.8      CO2 28   BUN 44*   CREATININE 1.0*   GLUCOSE 153*     Hepatic:   Recent Labs     09/30/20  1417   AST 37*   ALT 39*   BILITOT 0.5   ALKPHOS 65     Troponin T: No results for input(s): TROPONINI in the last 72 hours. ABGs: No results for input(s): PH, PCO2, PO2, HCO3, BE, O2SAT in the last 72 hours. INR: No results for input(s): INR in the last 72 hours. Lactic Acid:   Recent Labs     09/30/20  1629   LACTA 4.6*       URINALYSIS: glucosuria, no signs of infection  -----------------------------------------------------------------  PA/lat CXR: no acute process    EKG: NSR    Assessment and Plan   Principal Problem:    Dehydration  Active Problems:    Metabolic encephalopathy    Lactic acidosis    Acute kidney injury ([address]    Closed odontoid fracture with routine healing    Hypernatremia    Transaminitis  Resolved Problems:    * No resolved hospital problems. *      Admit for observation to med/surg. Continue IV fluid resuscitation. Follow electrolytes. Patient's baseline creatinine appears to be 0.6 to 0.7. No previous hypernatremia. PT, OT, SLP evaluations. COVID-19 testing due to possible exposure with slight lymphopenia, but would be asymptomatic if positive. Odontoid fracture appears to be healing despite the patient's noncompliance with cervical collar. No need for neurosurgery involvement at present. Home medications reconciled.     Thiago Diehl DO  Admitting Hospitalist

## 2020-09-30 NOTE — ED PROVIDER NOTES
[facility]  eMERGENCY dEPARTMENT eNCOUnter      Pt Name: Libby Long  MRN: [id number]  [place] 1935  Date of evaluation: 9/30/2020  Provider: Salvatore Cuello MD    [address] [place] [address]       Chief Complaint   Patient presents with    Failure To Thrive         HISTORY OF PRESENT ILLNESS   (Location/Symptom, Timing/Onset,Context/Setting, Quality, Duration, Modifying Factors, Severity)  Note limiting factors. Libby Long is a 82 y.o. female who presents to the emergency department      The history is provided by a relative. Altered Mental Status   Presenting symptoms: lethargy and partial responsiveness    Presenting symptoms comment:  Not speaking this am  Severity:  Moderate  Most recent episode: Today  Episode history:  Continuous  Timing:  Constant  Progression:  Unchanged  Chronicity:  New  Context: dementia and nursing home resident    Context comment:  Recent odontoid fx - has not been compliant with neck brace  Associated symptoms: no fever and no vomiting    Associated symptoms comment:  Says she's \"OK\"      NursingNotes were reviewed. REVIEW OF SYSTEMS    (2-9 systems for level 4, 10 or more for level 5)     Review of Systems   Constitutional: Negative for fever. Gastrointestinal: Negative for vomiting. All other systems reviewed and are negative. Except as noted above the remainder of the review of systems was reviewed and negative. PAST MEDICAL HISTORY     Past Medical History:   Diagnosis Date    DVT (deep venous thrombosis) ([facility]     left lower extremity         SURGICALHISTORY       Past Surgical History:   Procedure Laterality Date    COLONOSCOPY  2012         CURRENT MEDICATIONS       Current Discharge Medication List      CONTINUE these medications which have NOT CHANGED    Details   Misc. Devices MISC Electric hospital bed  Qty: 1 Device, Refills: 0    Associated Diagnoses: At risk for falls;  Major neurocognitive disorder ([facility] [address] [facility]      donepezil (ARICEPT) 10 MG tablet TAKE 1 TABLET BY MOUTH ONCE DAILY AT NIGHT  Qty: 90 tablet, Refills: 3    Associated Diagnoses: Major neurocognitive disorder (HCC)      memantine (NAMENDA) 10 MG tablet TAKE 1 TABLET BY MOUTH TWICE DAILY  Qty: 180 tablet, Refills: 3    Associated Diagnoses: Major neurocognitive disorder (HCC)      melatonin 3 MG TABS tablet Take 3 mg by mouth nightly as needed      pravastatin (PRAVACHOL) 80 MG tablet Take 1 tablet by mouth nightly  Qty: 90 tablet, Refills: 3    Associated Diagnoses: Familial hypercholesterolemia      lisinopril (PRINIVIL;ZESTRIL) 10 MG tablet TAKE 1 TABLET BY MOUTH ONCE DAILY IN THE MORNING  Qty: 90 tablet, Refills: 3    Associated Diagnoses: Essential (primary) hypertension      Cholecalciferol (VITAMIN D) 2000 units CAPS capsule Take by mouth      aspirin 81 MG tablet Take 81 mg by mouth daily      zoster recombinant adjuvanted vaccine (SHINGRIX) 50 MCG/0.5ML SUSR injection 1 vaccine now and repeat in 2-6 months. Qty: 0.5 mL, Refills: 1             ALLERGIES     Patient has no known allergies. FAMILY HISTORY       Family History   Problem Relation Age of Onset    Colon Cancer Daughter           SOCIAL HISTORY       Social History     Socioeconomic History    Marital status:       Spouse name: None    Number of children: None    Years of education: None    Highest education level: None   Occupational History    None   Social Needs    Financial resource strain: None    Food insecurity     Worry: None     Inability: None    Transportation needs     Medical: None     Non-medical: None   Tobacco Use    Smoking status: Never Smoker    Smokeless tobacco: Never Used   Substance and Sexual Activity    Alcohol use: No    Drug use: No    Sexual activity: Not Currently   Lifestyle    Physical activity     Days per week: None     Minutes per session: None    Stress: None   Relationships    Social connections     Talks on phone: None     Gets together: None     Attends Jain service: None     Active member of club or organization: None     Attends meetings of clubs or organizations: None     Relationship status: None    Intimate partner violence     Fear of current or ex partner: None     Emotionally abused: None     Physically abused: None     Forced sexual activity: None   Other Topics Concern    None   Social History Narrative    None       SCREENINGS    [place] Coma Scale  Eye Opening: Spontaneous  Best Verbal Response: Confused  Best Motor Response: Withdraws from pain  [place] Coma Scale Score: 12 @FLOW([id number])@      PHYSICAL EXAM    (up to 7 for level 4, 8 or more for level 5)     ED Triage Vitals   BP Temp Temp Source Pulse Resp SpO2 Height Weight   09/30/20 1411 09/30/20 1409 09/30/20 1409 09/30/20 1411 09/30/20 1411 09/30/20 1411 -- --   (!) 163/90 98.6 °F (37 °C) Oral 103 30 95 %         Physical Exam  Vitals signs and nursing note reviewed. Constitutional:       General: She is not in acute distress. Appearance: She is not diaphoretic. Comments: Opens eyes to voice, follows commands if repeated several times, somnolent   HENT:      Head: Normocephalic and atraumatic. Nose: Nose normal.      Mouth/Throat:      Mouth: Mucous membranes are dry. Eyes:      Pupils: Pupils are equal, round, and reactive to light. Neck:      Comments: In soft collar - neck not maipulated  Cardiovascular:      Rate and Rhythm: Normal rate and regular rhythm. Heart sounds: Normal heart sounds. Pulmonary:      Effort: Pulmonary effort is normal.      Breath sounds: Normal breath sounds. Abdominal:      Tenderness: There is no abdominal tenderness. Musculoskeletal:      Right lower leg: No edema. Left lower leg: No edema. Skin:     General: Skin is warm and dry.    Neurological:      Comments: Cannot cooperate with more formal neuro testing         DIAGNOSTIC RESULTS     EKG: All EKG's are interpreted by the Emergency Department Physician who either signs or Co-signsthis chart in the absence of a cardiologist.    EKG: sinus, VR 92, borderline T abnormalities inferior leads    RADIOLOGY:   Non-plain filmimages such as CT, Ultrasound and MRI are read by the radiologist. Plain radiographic images are visualized and preliminarily interpreted by the emergency physician with the below findings:        Interpretation per the Radiologist below, if available at the time ofthis note:    CT Cervical Spine WO Contrast   Final Result   1. Subacute type III odontoid fracture. 2. Probable subacute T1 superior endplate compression fracture. 3. Degenerative changes. 4. No definite acute osseous abnormality of the cervical spine   otherwise. Signed by Dr David Corbett on 9/30/2020 3:29 PM      CT Head WO Contrast   Final Result   Impression:    1. Posterior fossa artifact and associated Image quality degradation. 2. No definite acute intracranial process. Signed by Dr David Corbett on 9/30/2020 3:22 PM      XR CHEST PORTABLE   Final Result   No active cardiopulmonary disease.    Signed by Dr Salina Schilder on 9/30/2020 3:07 PM            ED BEDSIDE ULTRASOUND:   Performed by ED Physician - none    LABS:  Labs Reviewed   CBC WITH AUTO DIFFERENTIAL - Abnormal; Notable for the following components:       Result Value    WBC 17.2 (*)     Hematocrit 47.3 (*)     MCHC 31.1 (*)     Neutrophils % 87.2 (*)     Lymphocytes % 5.4 (*)     Neutrophils Absolute 15.0 (*)     Lymphocytes Absolute 0.9 (*)     Monocytes Absolute 1.10 (*)     All other components within normal limits   COMPREHENSIVE METABOLIC PANEL W/ REFLEX TO MG FOR LOW K - Abnormal; Notable for the following components:    Sodium 150 (*)     Glucose 153 (*)     BUN 44 (*)     CREATININE 1.0 (*)     GFR Non- 53 (*)     ALT 39 (*)     AST 37 (*)     All other components within normal limits   URINE RT REFLEX TO CULTURE - Abnormal; Notable for the following components:    Glucose, Ur 100 (*)     Blood, Urine TRACE (*)     Protein, UA TRACE (*)     All other components within normal limits   LACTIC ACID, PLASMA - Abnormal; Notable for the following components:    Lactic Acid 4.6 (*)     All other components within normal limits    Narrative:     CALL  Rizvi  K[facility] tel. ,  Chemistry results called to and read back by Jailene lorenzo er, 09/30/2020 17:04,  by [address] - Abnormal; Notable for the following components:    Bacteria, UA NEGATIVE (*)     Crystals, UA NEG (*)     RBC, UA 6 (*)     All other components within normal limits   BASIC METABOLIC PANEL W/ REFLEX TO MG FOR LOW K   CBC   COVID-19   COVID-19   COVID-19       All other labs were within normal range or not returned as of this dictation. EMERGENCY DEPARTMENT COURSE and DIFFERENTIAL DIAGNOSIS/MDM:   Vitals:    Vitals:    09/30/20 1634 09/30/20 1703 09/30/20 1735 09/30/20 1838   BP: (!) 96/52 (!) 102/42 (!) 100/56 126/70   Pulse: 94 84 91 78   Resp: 18 17 20 16   Temp:   98.9 °F (37.2 °C) 97 °F (36.1 °C)   TempSrc:   Temporal    SpO2:   95% 95%           MDM  Number of Diagnoses or Management Options  Altered mental status, unspecified altered mental status type:   Dehydration:   Hypernatremia:   Diagnosis management comments: Discussed with Dr. Cassandra Sloan - OBS, hydration      CRITICAL CARE TIME   Total Critical Care time was 0 minutes, excluding separately reportable procedures. There was a high probability of clinically significant/lifethreatening deterioration in the patient's condition which required my urgent intervention. CONSULTS:  None    PROCEDURES:  Unless otherwise noted below, none     Procedures    FINAL IMPRESSION      1. Altered mental status, unspecified altered mental status type    2. Dehydration    3. Hypernatremia          DISPOSITION/PLAN   DISPOSITION        PATIENT REFERRED TO:  No follow-up provider specified.     DISCHARGE MEDICATIONS:  Current Discharge Medication List             (Please note that portions of this note were completed with a voice recognition program.  Efforts were made to edit the dictations but occasionally words are mis-transcribed.)    Sybil Phoenix, MD (electronically signed)  Attending Emergency Physician          Sybil Phoenix, MD  09/30/20 0143

## 2020-09-30 NOTE — ED NOTES
ASSESSMENT:  Pt from nursing home with increased [address] Baseline per family. SKIN:  Warm, dry, pink. Mm dry. Cap refill < 2 sec  CARDIAC:  S1 S2 noted  LUNGS: clear upper and lower lobes. Respirations even and unlabored. ABDOMEN: bowel sounds noted upper and lower quadrants. Soft and tender. EXTREMITIES: bilateral DP and PT. No edema noted. Pt alert to voice, will follow commands after repeating them. Pupils equal and reactive. No distress noted. Side rails up and call light within reach.              Wing Pal, RN  09/30/20 4656

## 2020-10-01 PROBLEM — Z51.5 PALLIATIVE CARE PATIENT: Status: ACTIVE | Noted: 2020-01-01

## 2020-10-01 PROBLEM — R13.10 DYSPHAGIA: Status: ACTIVE | Noted: 2020-01-01

## 2020-10-01 NOTE — PLAN OF CARE
Problem: Falls - Risk of:  Goal: Will remain free from falls  Description: Will remain free from falls  10/1/2020 0353 by Sai Serrano RN  Outcome: Ongoing  9/30/2020 1826 by Ana Davis RN  Outcome: Ongoing  Goal: Absence of physical injury  Description: Absence of physical injury  10/1/2020 0353 by Sai Serrano RN  Outcome: Ongoing  9/30/2020 1826 by Ana Davis RN  Outcome: Ongoing     Problem: Skin Integrity:  Goal: Will show no infection signs and symptoms  Description: Will show no infection signs and symptoms  10/1/2020 0353 by Sai Serrano RN  Outcome: Ongoing  9/30/2020 1826 by Ana Davis RN  Outcome: Ongoing  Goal: Absence of new skin breakdown  Description: Absence of new skin breakdown  10/1/2020 0353 by Sai Serrano RN  Outcome: Ongoing  9/30/2020 1826 by Ana Davis RN  Outcome: Ongoing     Problem: Confusion - Acute:  Goal: Absence of continued neurological deterioration signs and symptoms  Description: Absence of continued neurological deterioration signs and symptoms  10/1/2020 0353 by Sai Serrano RN  Outcome: Ongoing  9/30/2020 1826 by Ana Davis RN  Outcome: Ongoing  Goal: Mental status will be restored to baseline  Description: Mental status will be restored to baseline  10/1/2020 0353 by Sai Serrano RN  Outcome: Ongoing  9/30/2020 1826 by Ana Davis RN  Outcome: Ongoing     Problem: Discharge Planning:  Goal: Ability to perform activities of daily living will improve  Description: Ability to perform activities of daily living will improve  10/1/2020 0353 by Sai Serrano RN  Outcome: Ongoing  9/30/2020 1826 by Ana Davis RN  Outcome: Ongoing  Goal: Participates in care planning  Description: Participates in care planning  10/1/2020 0353 by Sai Serrano RN  Outcome: Ongoing  9/30/2020 1826 by Ana Davis RN  Outcome: Ongoing     Problem: Injury - Risk of, Physical Injury:  Goal: Will remain free from falls  Description: Will remain free from falls  10/1/2020 8185 by Jose Candelaria RN  Outcome: Ongoing  9/30/2020 1826 by Teddi Libman, RN  Outcome: Ongoing  Goal: Absence of physical injury  Description: Absence of physical injury  10/1/2020 0353 by Jose Candelaria RN  Outcome: Ongoing  9/30/2020 1826 by Teddi Libman, RN  Outcome: Ongoing     Problem: Mood - Altered:  Goal: Mood stable  Description: Mood stable  10/1/2020 0353 by Jose Candelaria RN  Outcome: Ongoing  9/30/2020 1826 by Teddi Libman, RN  Outcome: Ongoing  Goal: Absence of abusive behavior  Description: Absence of abusive behavior  10/1/2020 0353 by Jose Candelaria RN  Outcome: Ongoing  9/30/2020 1826 by Teddi Libman, RN  Outcome: Ongoing  Goal: Verbalizations of feeling emotionally comfortable while being cared for will increase  Description: Verbalizations of feeling emotionally comfortable while being cared for will increase  10/1/2020 0353 by Jose Candelaria RN  Outcome: Ongoing  9/30/2020 1826 by Teddi Libman, RN  Outcome: Ongoing     Problem: Psychomotor Activity - Altered:  Goal: Absence of psychomotor disturbance signs and symptoms  Description: Absence of psychomotor disturbance signs and symptoms  10/1/2020 0353 by Jose Candelaria RN  Outcome: Ongoing  9/30/2020 1826 by Teddi Libman, RN  Outcome: Ongoing     Problem: Sensory Perception - Impaired:  Goal: Demonstrations of improved sensory functioning will increase  Description: Demonstrations of improved sensory functioning will increase  10/1/2020 0353 by Jose Candelaria RN  Outcome: Ongoing  9/30/2020 1826 by Teddi Libman, RN  Outcome: Ongoing  Goal: Decrease in sensory misperception frequency  Description: Decrease in sensory misperception frequency  10/1/2020 0353 by Jose Candelaria RN  Outcome: Ongoing  9/30/2020 1826 by Teddi Libman, RN  Outcome: Ongoing  Goal: Able to refrain from responding to false sensory perceptions  Description: Able to refrain from responding to false sensory perceptions  10/1/2020 0353 by Jose Candelaria RN  Outcome: Ongoing  9/30/2020 1826 by Christiano Cruz RN  Outcome: Ongoing  Goal: Demonstrates accurate environmental perceptions  Description: Demonstrates accurate environmental perceptions  10/1/2020 0353 by Griselda Jeffrey RN  Outcome: Ongoing  9/30/2020 1826 by Christiano Cruz RN  Outcome: Ongoing  Goal: Able to distinguish between reality-based and nonreality-based thinking  Description: Able to distinguish between reality-based and nonreality-based thinking  10/1/2020 0353 by Griselda Jeffrey RN  Outcome: Ongoing  9/30/2020 1826 by Christiano Cruz RN  Outcome: Ongoing  Goal: Able to interrupt nonreality-based thinking  Description: Able to interrupt nonreality-based thinking  10/1/2020 0353 by Griselda Jeffrey RN  Outcome: Ongoing  9/30/2020 1826 by Christiano Cruz RN  Outcome: Ongoing     Problem: Sleep Pattern Disturbance:  Goal: Appears well-rested  Description: Appears well-rested  10/1/2020 0353 by Griselda Jeffrey RN  Outcome: Ongoing  9/30/2020 1826 by Christiano Cruz RN  Outcome: Ongoing

## 2020-10-01 NOTE — PROGRESS NOTES
Hospitalist Progress Note  10/1/2020 4:11 PM  Subjective:   Admit Date: 9/30/2020  PCP: Arlene Donaldson MD    Chief Complaint: altered mental status    Subjective: Patient is feeling about the same today. Per daughter, seems about as confused and lethargic. The patient was evaluated by SLP after nursing staff found a food bolus adhered to her mouth. She was made NPO. Palliative care was consulted to discuss options and it seems the family is leaning towards hospice. History is otherwise unchanged. Cumulative Hospital History:   9-30: Brought to ED from SNF with lack of responsiveness and neck hyperextension. Patient with odontoid fracture in soft cervical collar due to noncompliance with stiff collar. Has had functional decline rapidly since she was admitted to SNF. Patient not eating and drinking well, sodium high. Admitted for observation to med/surg for dehydration and failure to thrive. IV fluids given. 10-1: Nursing found a food bolus adhered in the patient's mouth. SLP evaluation showed impaired swallowing function and she was made NPO. I am not certain that PEG tube is appropriate for the patient so ordered a palliative care consult. It seems they are leaning towards hospice but will have a family meeting tomorrow to come to a decision. As she is not yet safe for discharge due to inability to receive nutrition, will convert to inpatient. ROS: 14 point review of systems is negative except as specifically addressed above.     Diet NPO Effective Now    Intake/Output Summary (Last 24 hours) at 10/1/2020 1611  Last data filed at 10/1/2020 1406  Gross per 24 hour   Intake 853 ml   Output 400 ml   Net 453 ml     Medications:   sodium chloride Stopped (10/01/20 0247)     Current Facility-Administered Medications   Medication Dose Route Frequency Provider Last Rate Last Dose    0.9 % sodium chloride infusion  1,000 mL Intravenous Continuous Harl Shows, DO   Stopped at 10/01/20 0247    aspirin EC tablet 81 mg 81 mg Oral Daily Trenton Rizvi, DO        donepezil (ARICEPT) tablet 10 mg  10 mg Oral Nightly Trenton Rizvi, DO        lisinopril (PRINIVIL;ZESTRIL) tablet 10 mg  10 mg Oral Daily Trenton Rizvi, DO        melatonin tablet 3 mg  3 mg Oral Nightly PRN [facility], DO        memantine [facility]) tablet 10 mg  10 mg Oral BID [facility] Rizvi, DO        pravastatin (PRAVACHOL) tablet 80 mg  80 mg Oral Nightly Trenton Rizvi, DO        sodium chloride flush 0.9 % injection 10 mL  10 mL Intravenous 2 times per day [facility], DO        sodium chloride flush 0.9 % injection 10 mL  10 mL Intravenous PRN [facility], DO        acetaminophen (TYLENOL) tablet 650 mg  650 mg Oral Q6H PRN [facility], DO        Or    acetaminophen (TYLENOL) suppository 650 mg  650 mg Rectal Q6H PRN [facility], DO        promethazine (PHENERGAN) tablet 12.5 mg  12.5 mg Oral Q6H PRN [facility], DO        Or    [facility] injection 4 mg  4 mg Intravenous Q6H PRN [facility], DO        enoxaparin (LOVENOX) injection 40 mg  40 mg Subcutaneous Q24H [facility], DO   40 mg at 09/30/20 2145        Labs:     Recent Labs     09/30/20  1417 10/01/20  0230   WBC 17.2* 10.0   RBC 4.92 3.94*   HGB 14.7 11.7*   HCT 47.3* 37.3   MCV 96.1 94.7   MCH 29.9 29.7   MCHC 31.1* 31.4*    248     Recent Labs     09/30/20  1417 10/01/20  0230   * 148*   K 4.8 4.0   ANIONGAP 15 10    115*   CO2 28 23   BUN 44* 30*   CREATININE 1.0* 0.6   GLUCOSE 153* 110*   CALCIUM 9.6 8.3*     No results for input(s): MG, PHOS in the last 72 hours. Recent Labs     09/30/20  1417   AST 37*   ALT 39*   BILITOT 0.5   ALKPHOS 65     ABGs:No results for input(s): PH, PO2, PCO2, HCO3, BE, O2SAT in the last 72 hours. Troponin T: No results for input(s): TROPONINI in the last 72 hours. INR: No results for input(s): INR in the last 72 hours.   Lactic Acid:   Recent Labs     09/30/20  1629   LACTA 4.6*       Objective:   Vitals: /71   Pulse 97   Temp 97 °F (36.1 °C) (Temporal) Resp 20   Ht 5' 3\" (1.6 m)   SpO2 99%   BMI 27.28 kg/m²   24HR INTAKE/OUTPUT:      Intake/Output Summary (Last 24 hours) at 10/1/2020 1611  Last data filed at 10/1/2020 1406  Gross per 24 hour   Intake 853 ml   Output 400 ml   Net 453 ml     General appearance: alert and cooperative with exam  HEENT: atraumatic, in cervical collar, eyes with clear conjunctiva and normal lids, pupils and irises normal, external ears and nose are normal, lips normal  Neck without masses, lympadenopathy, bruit, thyroid normal  Lungs: no increased work of breathing, diminished breath sounds bilaterally  Heart: regular rate and rhythm, S1, S2 normal, no murmur, click, rub or gallop  Abdomen: soft, non-tender; bowel sounds normal; no masses,  no organomegaly  Extremities: extremities normal, atraumatic, no cyanosis or edema  Neurologic: normal sensation, alert but disoriented,flat affect  Skin: no rashes, nodules    Assessment and Plan:   Principal Problem:    Dehydration  Active Problems:    Metabolic encephalopathy    Lactic acidosis    Acute kidney injury ([address]    Closed odontoid fracture with routine healing    Hypernatremia    Transaminitis    Palliative care patient    Alzheimer's dementia ([address]    Altered mental status    Dysphagia  Resolved Problems:    * No resolved hospital problems. *      Change to inpatient admission as patient is NPO and unable to receive fluids and nutrition save via IV at present. Palliative care consulted. Remain NPO for now. Family will meet tomorrow to decide but leaning towards hospice. Increase IV fluids for maintenance. Odontoid fracture appears to be healing despite the patient's noncompliance with cervical collar. No need for neurosurgery involvement at present.     Advance Directive: Full Code    DVT prophylaxis: enoxaparin    Discharge planning: back to SNF, likely with hospice      DO Dante Hagan Hospitalist

## 2020-10-01 NOTE — CONSULTS
smoked. She has never used smokeless tobacco.  ETOH:   reports no history of alcohol use.   Patient currently lives in a nursing home    Family History:       Problem Relation Age of Onset    Colon Cancer Daughter        Palliative Performance Score: 30%    Review of Systems   Unable to obtain due to AMS    Palliative Review of Advance Directives:     Surrogate Decision Maker:yes, Humble Smith    Durable Power of :yes    Advanced Directives/Living Gr: yes, copy in soft chart    Out of hospital medical orders in place to reflect resuscitation status (MOLST/POLST): no    Information Sharing:  Patient's awareness of illness:  [] Terminal [] Life-Threatening [] Serious [] Non life-threatening [] Not serious   [x] Not discussed    Family awareness of illness:   [] Terminal [] Life-Threatening [x] Serious [] Nonlife-threatening [] Not serious   [] Not discussed    Diet NPO Effective Now    Medications:   sodium chloride Stopped (10/01/20 0247)     Current Facility-Administered Medications   Medication Dose Route Frequency Provider Last Rate Last Dose    0.9 % sodium chloride infusion  1,000 mL Intravenous Continuous Linda Temi, DO   Stopped at 10/01/20 0247    aspirin EC tablet 81 mg  81 mg Oral Daily Wes Rizvi, DO        donepezil (ARICEPT) tablet 10 mg  10 mg Oral Nightly Wes Rizvi, DO        lisinopril (PRINIVIL;ZESTRIL) tablet 10 mg  10 mg Oral Daily Wes Rizvi, DO        melatonin tablet 3 mg  3 mg Oral Nightly PRN Linda Temi, DO        memantine (NAMENDA) tablet 10 mg  10 mg Oral BID Wes Rizvi, DO        pravastatin (PRAVACHOL) tablet 80 mg  80 mg Oral Nightly Wes Rizvi, DO        sodium chloride flush 0.9 % injection 10 mL  10 mL Intravenous 2 times per day Linda Temi, DO        sodium chloride flush 0.9 % injection 10 mL  10 mL Intravenous PRN Linda Temi, DO        acetaminophen (TYLENOL) tablet 650 mg  650 mg Oral Q6H PRN Linda Temi, DO        Or    acetaminophen (TYLENOL) suppository 650 mg voice recognition program.  Efforts were made to edit the dictations but occasionally words are mis-transcribed.)

## 2020-10-01 NOTE — PROGRESS NOTES
Elinor CARR attempted to suction roof of mouth, where a hardened secretion was stuck. She was able to remove it by hand; speech therapy informed.    Electronically signed by Ciera Cannon RN on 10/1/2020 at 9:02 AM

## 2020-10-01 NOTE — PROGRESS NOTES
Was called by VINCENZO Azul to assess roof of pt mouth because there appeared to be a mass that was oddly colored. Upon assessment of the pt roof of mouth, it was a build-up of yellow-colored phlegm. The phlegm was first suctioned in an attempt to remove it, but it was too thick for the suction to remove. Since the phlegm was not stuck to the roof of her mouth, I used a wet wash cloth to remove the phlegm. Roof of mouth showed no signs of irritation after phlegm removal. Renetta instructed to provide mouth care throughout the shift. Will continue to assess.     Electronically signed by Hamilton Middleton RN on 10/1/2020 at 8:43 AM

## 2020-10-01 NOTE — PROGRESS NOTES
Speech Language Pathology  Facility/Department: [facility] 4 ONCOLOGY UNIT   CLINICAL BEDSIDE SWALLOW EVALUATION  SPEECH PRODUCTION EVALUATION     NAME: Cruzito Michelle  : 1935  MRN: [id number]    ADMISSION DATE: 2020  ADMITTING DIAGNOSIS: has Major neurocognitive disorder ([address] Essential (primary) hypertension; OAB (overactive bladder); Familial hypercholesterolemia; Subclinical hyperthyroidism; Edema of left lower extremity; Dehydration; Metabolic encephalopathy; Lactic acidosis; Acute kidney injury ([address] Closed odontoid fracture with routine healing; Hypernatremia; Transaminitis; and Palliative care patient on their problem list.    Date of Eval: 10/1/2020  Evaluating Therapist: Celena Menard    Current Diet level:  Regular solid consistency and thin liquids    Reason for Referral  Cruzito Michelle was referred for a bedside swallow evaluation to assess the efficiency of her swallow function, identify signs and symptoms of aspiration and make recommendations regarding safe dietary consistencies, effective compensatory strategies, and safe eating environment. Impression  Assessed patient's swallowing function. Patient exhibits decreased oral prep of more solid consistencies as well as inconsistent absent swallows. At times, patient exhibited sluggish, mild-moderately decreased laryngeal elevation for swallow airway protection. Other times, just laryngeal rocking was noted. Patient exhibits degree of airway detection with consistent delayed coughs observed following probable penetration/aspiration of PO trials (single ice chip trials). At this time, would recommend NPO status. Daily oral care, via staff, as question management of secretions. If patient receives mouth care prior to intake, okay for ice chips and swabs of thin H2O for comfort. Will continue to follow.      Treatment Plan  Requires SLP Intervention: Yes     Recommended Diet and Intervention  Diet Solids Recommendation: NPO  Liquid by SLP.)     Pharyngeal Phase  Suspected Swallow Delay: Ice chips (Suspect, when the patient swallowed, secondary to oral transit times.)  Decreased Laryngeal Elevation: (When the patient swallowed, patient exhibited sluggish, mild-moderately decreased laryngeal elevation for swallow airway protection. Other times, just laryngeal rocking was noted.)  Delayed Cough: Ice chips   Pharyngeal Phase - Comment: As previously mentioned, patient exhibited inconsistent absent swallows. At times, patient exhibited sluggish, mild-moderately decreased laryngeal elevation for swallow airway protection. Other times, just laryngeal rocking was noted. Patient exhibited degree of airway detection with consistent delayed coughs observed following probable penetration/aspiration of PO trials (single ice chip trials). At this time, would recommend NPO status. Daily oral care, via staff, as question management of secretions. If patient receives mouth care prior to intake, okay for ice chips and swabs of thin H2O for comfort. Will continue to follow.     Electronically signed by FRIEDA Wayne on 10/1/2020 at 1:22 PM

## 2020-10-01 NOTE — PROGRESS NOTES
Physical Therapy   Name: Juan Garcia  MRN:  [id number]  Date of service:  10/1/2020  RN, Nigel Kelly but states pt may refuse. Spoke with daughter and pt. Pt. declined PT at this time. Will f/u at a later time.   Electronically signed by Walt Tran PT on 10/1/2020 at 12:05 PM

## 2020-10-01 NOTE — PLAN OF CARE
Problem: Nutrition  Goal: Optimal nutrition therapy  Outcome: Ongoing   Nutrition Problem #1: Predicted inadequate energy intake  Intervention: Food and/or Nutrient Delivery: Continue Current Diet  Nutritional Goals: PO intake >50%, wt stable

## 2020-10-01 NOTE — PROGRESS NOTES
Comprehensive Nutrition Assessment    Type and Reason for Visit:  Initial, Positive Nutrition Screen    Nutrition Assessment:  Positive nutrition screen for poor appetite and wt loss. Unable to assess wt loss at this time as admit wt has not been recorded. Will order new wt. Per notes, pt has not been eating or drinking well and is unable to provide meaningful responses. Awaiting SLP eval. Will await new wt and SLP recommendations and implement intervention as needed. Malnutrition Assessment:  Malnutrition Status:  Insufficient data    Context:  Acute Illness       Estimated Daily Nutrient Needs:  Energy (kcal):  0992-7378; Weight Used for Energy Requirements:  Current     Protein (g):  ; Weight Used for Protein Requirements:  Ideal(1.3-2.0)        Fluid (ml/day):  1463-8958; Weight Used for Fluid Requirements:  Current       Wounds:  None       Current Nutrition Therapies:    DIET LOW SODIUM 2 GM; Anthropometric Measures:  · Height: 5' 3\" (160 cm)  · Usual Body Weight: 154 lb (69.9 kg)(2/2020)     · Ideal Body Weight: 115 lbs   · Adjusted Body Weight:  ; No Adjustment     Nutrition Diagnosis:   · Predicted inadequate energy intake related to cognitive or neurological impairment, early satiety as evidenced by poor intake prior to admission      Nutrition Interventions:   Food and/or Nutrient Delivery:  Continue Current Diet  Nutrition Education/Counseling:  No recommendation at this time   Coordination of Nutrition Care:  Speech Therapy    Goals:  PO intake >50%, wt stable       Nutrition Monitoring and Evaluation:   Food/Nutrient Intake Outcomes:  Food and Nutrient Intake  Physical Signs/Symptoms Outcomes:  Biochemical Data, Skin, Weight, Nutrition Focused Physical Findings     Discharge Planning:     Too soon to determine     Electronically signed by Clif Ferguson RD, LD on 10/1/20 at 8:48 AM CDT    Contact: [phone]

## 2020-10-02 PROBLEM — N17.9 ACUTE KIDNEY INJURY (HCC): Status: RESOLVED | Noted: 2020-01-01 | Resolved: 2020-01-01

## 2020-10-02 PROBLEM — E86.0 DEHYDRATION: Status: RESOLVED | Noted: 2020-01-01 | Resolved: 2020-01-01

## 2020-10-02 NOTE — PROGRESS NOTES
Consult received. Hospice in the SNF services were explained to the pts dtr. It is ok to dc the pt when medically ready. Hospice will monitor for dc and will meet the pt at the SNF for adm to hospice.

## 2020-10-02 NOTE — DISCHARGE SUMMARY
COMPARISON: None available DOSE: Radiation dose equals  mGy cm. AUTOMATED EXPOSURE CONTROL dose reduction technique was implemented. TECHNIQUE:  2 mm sequential transaxial images were obtained. 2-D sagittal and coronal reconstruction images were generated. FINDINGS: Lloyd Sosa is a type III odontoid fracture identified with evidence of partial healing and persistent fracture changes. There is mild anterolisthesis of the anterior fracture fragment. There is a superior endplate compression fracture of T1 with sclerotic changes and minimal fracture lines, suspect subacute. There is mild anterior wedging with an estimated 10% loss vertebral body height maximally. Correlate with clinical findings. The vertebral body heights are maintained otherwise. The facets are appropriately aligned with diffuse facet arthropathy with hypertrophic changes. There is disc degeneration with disc space narrowing C6-C7 to lesser degree C5-C6 with osteophyte formation. There is very mild anterolisthesis of C6 on C7, degenerative in nature. There is no CT evidence of canal stenosis or posttraumatic disc herniation. There is relative foraminal narrowing appreciated at multiple levels particularly at C5-C6 and C6-C7 and C4-C5. There are carotid artery calcifications. There are fibronodular changes in the lung apices.]    1. Subacute type III odontoid fracture. 2. Probable subacute T1 superior endplate compression fracture. 3. Degenerative changes. 4. No definite acute osseous abnormality of the cervical spine otherwise. Signed by Dr Randal Hurd on 9/30/2020 3:29 PM    Xr Chest Portable    Result Date: 9/30/2020  Examination. XR CHEST PORTABLE 9/30/2020 1:52 PM History: Altered mental status. A single, frontal, portable, upright view of the chest is obtained. There is no previous study for comparison. The lungs are moderately well-expanded. There is no evidence of recent infiltrate, pleural effusion, pulmonary congestion or pneumothorax. The heart size is in the normal range. The atheromatous changes of thoracic aorta are noted. No acute bony abnormality. There is moderate diffuse osteopenia. No active cardiopulmonary disease. Signed by Dr James Haq on 9/30/2020 3:07 PM      Pertinent Labs:     CBC:   Recent Labs     09/30/20  1417 10/01/20  0230 10/02/20  0119   WBC 17.2* 10.0 11.1*   HGB 14.7 11.7* 12.4    248 243     BMP:    Recent Labs     09/30/20  1417 10/01/20  0230 10/02/20  0409   * 148* 147*   K 4.8 4.0 3.8    115* 112*   CO2 28 23 23   BUN 44* 30* 18   CREATININE 1.0* 0.6 0.6   GLUCOSE 153* 110* 83     INR: No results for input(s): INR in the last 72 hours. ABGs:No results for input(s): PH, PO2, PCO2, HCO3, BE, O2SAT in the last 72 hours. Lactic Acid:  Recent Labs     09/30/20  1629   LACTA 4.6*       Procedures: None performed. Hospital Course:   9-30: Brought to ED from SNF with lack of responsiveness and neck hyperextension. Patient with odontoid fracture in soft cervical collar due to noncompliance with stiff collar. Has had functional decline rapidly since she was admitted to SNF. Patient not eating and drinking well, sodium high. Admitted for observation to med/surg for dehydration and failure to thrive. IV fluids given. 10-1: Nursing found a food bolus adhered in the patient's mouth. SLP evaluation showed impaired swallowing function and she was made NPO. I am not certain that PEG tube is appropriate for the patient so ordered a palliative care consult. It seems they are leaning towards hospice but will have a family meeting tomorrow to come to a decision. As she is not yet safe for discharge due to inability to receive nutrition, will convert to inpatient. 10-2: Patient is stable. Family has discussed and elected to go back to SNF with hospice. Does not wish to pursue enteral feedings.  SLP evaluation shows risk of aspiration, recommends total feed for patient for oral intake with acknowledgement of aspiration risk. Recommends only ice chips between meals. Physical Exam:  Vitals: /74   Pulse 84   Temp 96.9 °F (36.1 °C) (Temporal)   Resp 16   Ht 5' 3\" (1.6 m)   SpO2 95%   BMI 27.28 kg/m²   24HR INTAKE/OUTPUT:      Intake/Output Summary (Last 24 hours) at 10/2/2020 1317  Last data filed at 10/2/2020 0800  Gross per 24 hour   Intake 60 ml   Output 1200 ml   Net -1140 ml     General appearance: alert and cooperative with exam  HEENT: atraumatic, eyes with clear conjunctiva and normal lids, pupils and irises normal, external ears and nose are normal, lips normal. Neck without masses, lympadenopathy, bruit, thyroid normal  Lungs: no increased work of breathing, diminished breath sounds bilaterally  Heart: regular rate and rhythm, S1, S2 normal, no murmur, click, rub or gallop  Abdomen: soft, non-tender; bowel sounds normal; no masses,  no organomegaly  Extremities: extremities normal without clubbing, atraumatic, no cyanosis or edema  Neurologic: alert but disoriented, affect and mood appropriate  Skin: no jaundice, rashes, or nodules    Discharge Medications:       Levon Green   Home Medication Instructions [id number]:[id number]    Printed on:10/02/20 1317   Medication Information                      aspirin 81 MG tablet  Take 81 mg by mouth daily             Cholecalciferol (VITAMIN D) 2000 units CAPS capsule  Take by mouth             donepezil (ARICEPT) 10 MG tablet  TAKE 1 TABLET BY MOUTH ONCE DAILY AT NIGHT             lisinopril (PRINIVIL;ZESTRIL) 10 MG tablet  TAKE 1 TABLET BY MOUTH ONCE DAILY IN THE MORNING             melatonin 3 MG TABS tablet  Take 3 mg by mouth nightly as needed             memantine (NAMENDA) 10 MG tablet  TAKE 1 TABLET BY MOUTH TWICE DAILY             Misc. Devices [facility]  Electric hospital bed             pravastatin (PRAVACHOL) 80 MG tablet  Take 1 tablet by mouth nightly                 Discharge Instructions: Follow up with Shaan Mayers MD in 7 days.   Take medications as directed. Resume activity as tolerated. Diet: DIET DYSPHAGIA PUREED; Moderately Thick (Honey)     Disposition: Patient is medically stable and will be discharged Skilled nursing facility. Time spent on discharge greater than 30 minutes.     Signed:  Isabel Banks DO

## 2020-10-02 NOTE — DISCHARGE INSTR - COC
Continuity of Care Form    Patient Name: Tyra Branham   :  1935  MRN:  [id number]    Admit date:  2020  Discharge date: 10/02/20    Code Status Order: [facility]   Advance Directives:   [address] Directive Type of Healthcare Directive Copy in [address] Agent's Name Healthcare Agent's Phone Number    20 1810  Yes, patient has an advance directive for healthcare treatment  Living will  No, copy requested from family  --  --  --          Admitting Physician:  Juanito Austin DO  PCP: Martha Christopher MD    Discharging Nurse: Tray Christopher [facility] Unit/Room#: 8309/354-75  Discharging Unit Phone Number: [phone]    Emergency Contact:   Extended Emergency Contact Information  Primary Emergency Contact: Chiara Jackson [address] Phone: [phone]  Relation: Child  Secondary Emergency Contact: Remigio Barreto [address] Phone: [phone]  Relation: Child    Past Surgical History:  Past Surgical History:   Procedure Laterality Date    COLONOSCOPY         Immunization History:   Immunization History   Administered Date(s) Administered    Influenza, High Dose (Fluzone 65 yrs and older) 10/08/2001, 2001, 2002, 11/10/2012, 10/14/2014, 10/15/2015, 10/20/2016, 10/28/2017, 2018    Influenza, Triv, inactivated, subunit, adjuvanted, IM (Fluad 65 yrs and older) 10/04/2019    Pneumococcal Conjugate 13-valent (Zahzywn77) 2015    Pneumococcal Polysaccharide (Tfpdtuwug22) 2013    Td (Adult), 5 Lf Tetanus Toxoid, Pf (Tenivac, Decavac) 2018    Td, unspecified formulation 2015       Active Problems:  Patient Active Problem List   Diagnosis Code    Major neurocognitive disorder ([facility] F01.50    Essential (primary) hypertension I10    OAB (overactive bladder) N32.81    Familial hypercholesterolemia E78.01    Subclinical hyperthyroidism E05.90    Edema of left lower extremity F74.0    Metabolic encephalopathy W18.95    Lactic acidosis E87.2    Closed odontoid fracture with routine healing S12.100D    Hypernatremia E87.0    Transaminitis R74.01    Palliative care patient Z51.5    Alzheimer's dementia ([facility] G30.9, F02.80    Altered mental status R41.82    Dysphagia R13.10       Isolation/Infection:   Isolation          No Isolation        Patient Infection Status     Infection Onset Added Last Indicated Last Indicated By Review Planned Expiration Resolved Resolved By    None active    Resolved    COVID-19 Rule Out 09/30/20 09/30/20 09/30/20 COVID-19 (Ordered)   09/30/20 Rule-Out Test Resulted          Nurse Assessment:  Last Vital Signs: /65   Pulse 84   Temp 97.6 °F (36.4 °C) (Temporal)   Resp 16   Ht 5' 3\" (1.6 m)   SpO2 96%   BMI 27.28 kg/m²     Last documented pain score (0-10 scale): Pain Level: 0  Last Weight:   Wt Readings from Last 1 Encounters:   02/12/20 154 lb (69.9 kg)     Mental Status:  alert    IV Access:  - None    Nursing Mobility/ADLs:  Walking   Assisted  Transfer  Assisted  Bathing  Assisted  Dressing  Assisted  Toileting  Assisted  Feeding  Independent  Med Admin  Dependent  Med Delivery   crushed and prefers mixed with apple sauce or pudding    Wound Care Documentation and Therapy:        Elimination:  Continence:   · Bowel: Episodes of incontinence  · Bladder: Episodes of incontinent  Urinary Catheter: None   Colostomy/Ileostomy/Ileal Conduit: No       Date of Last BM: JUANA    Intake/Output Summary (Last 24 hours) at 10/2/2020 1613  Last data filed at 10/2/2020 0800  Gross per 24 hour   Intake 60 ml   Output 800 ml   Net -740 ml     I/O last 3 completed shifts: In: 61 [P.O.:60]  Out: 800 [Urine:800]    Safety Concerns:      At Risk for Falls and Aspiration Risk    Impairments/Disabilities:      None    Nutrition Therapy:  Current Nutrition Therapy:   - Oral Diet:  NPO    Routes of Feeding: None  Liquids: Honey Thick Liquids  Daily Fluid Restriction: no  Last Modified Barium Swallow with Video (Video Swallowing Test): not done    Treatments at the Time of Hospital Discharge:   Respiratory Treatments: N/A  Oxygen Therapy:  is not on home oxygen therapy. Ventilator:    - No ventilator support    Rehab Therapies: Physical Therapy and Occupational Therapy  Weight Bearing Status/Restrictions: No weight bearing restirctions  Other Medical Equipment (for information only, NOT a DME order):  wheelchair  Other Treatments: N/A    Patient's personal belongings (please select all that are sent with patient):  None    RN SIGNATURE:  Electronically signed by Nevaeh Crenshaw RN on 10/2/20 at 4:38 PM CDT    CASE MANAGEMENT/SOCIAL WORK SECTION    Inpatient Status Date: ***    Readmission Risk Assessment Score:  Readmission Risk              Risk of Unplanned Readmission:        12           Discharging to Facility/ Agency   · Name:   · Address:  · Phone:  · Fax:    Dialysis Facility (if applicable)   · Name:  · Address:  · Dialysis Schedule:  · Phone:  · Fax:    / signature: {Esignature:[id number]}    PHYSICIAN SECTION    Prognosis: {Prognosis:2865745831}    Condition at Discharge: [address] [place] [address] Patient Condition:[id number]}    Rehab Potential (if transferring to Rehab): {Prognosis:2270914117}    Recommended Labs or Other Treatments After Discharge: ***    Physician Certification: I certify the above information and transfer of Juan Garcia  is necessary for the continuing treatment of the diagnosis listed and that she requires {Admit to Appropriate Level of Care:06948} for {GREATER/LESS:017018175} 30 days.      Update Admission H&P: {CHP DME Changes in [facility]:840692775}    PHYSICIAN SIGNATURE:  {Esignature:[id number]}

## 2020-10-02 NOTE — PROGRESS NOTES
10/01/20 1752        Labs:     Recent Labs     09/30/20  1417 10/01/20  0230 10/02/20  0119   WBC 17.2* 10.0 11.1*   RBC 4.92 3.94* 4.20   HGB 14.7 11.7* 12.4   HCT 47.3* 37.3 40.3   MCV 96.1 94.7 96.0   MCH 29.9 29.7 29.5   MCHC 31.1* 31.4* 30.8*    248 243     Recent Labs     09/30/20  1417 10/01/20  0230 10/02/20  0409   * 148* 147*   K 4.8 4.0 3.8   ANIONGAP 15 10 12    115* 112*   CO2 28 23 23   BUN 44* 30* 18   CREATININE 1.0* 0.6 0.6   GLUCOSE 153* 110* 83   CALCIUM 9.6 8.3* 8.5*     No results for input(s): MG, PHOS in the last 72 hours. Recent Labs     09/30/20  1417   AST 37*   ALT 39*   BILITOT 0.5   ALKPHOS 65     ABGs:No results for input(s): PHART, ZNG5JLY, PO2ART, BPV3MZE, BEART, HGBAE, V9JKVEOZ, CARBOXHGBART, 02THERAPY in the last 72 hours. HgBA1c: No results for input(s): LABA1C in the last 72 hours. FLP:    Lab Results   Component Value Date    TRIG 76 02/19/2018    HDL 74 02/19/2018    LDLCALC 85 02/19/2018     TSH:    Lab Results   Component Value Date    TSH <0.005 02/19/2018     Troponin T: No results for input(s): TROPONINI in the last 72 hours. INR: No results for input(s): INR in the last 72 hours.     Objective:   Vitals: /74   Pulse 84   Temp 96.9 °F (36.1 °C) (Temporal)   Resp 16   Ht 5' 3\" (1.6 m)   SpO2 95%   BMI 27.28 kg/m²   24HR INTAKE/OUTPUT:      Intake/Output Summary (Last 24 hours) at 10/2/2020 1125  Last data filed at 10/2/2020 0800  Gross per 24 hour   Intake 60 ml   Output 1200 ml   Net -1140 ml     Physical Exam  General appearance: alert, no acute distress  HEENT: atraumatic, eyes with clear conjunctiva and normal lids, pupils and irises normal, external ears and nose are normal,lips normal.  Neck: without masses, supple, soft collar in place  Lungs: no increased work of breathing, clear but diminished in the bases bilaterally  Heart: regular rate and rhythm and S1, S2 normal  Abdomen: soft, non-tender; bowel sounds normal; no masses,  no organomegaly  Genitourinary: No bladder fullness, masses, or tenderness  Extremities: extremities normal, atraumatic, no cyanosis or edema  Neurologic: alert to self, will occasionally verbalize  Psychiatric: no agitation or anxiety, flat affect  Skin: warm, dry           Assessment and Plan:   Principal Problem:    Dehydration  Active Problems:    Metabolic encephalopathy    Lactic acidosis    Acute kidney injury ([facility]    Closed odontoid fracture with routine healing    Hypernatremia    Transaminitis    Palliative care patient    Alzheimer's dementia ([facility]    Altered mental status    Dysphagia  Resolved Problems:    * No resolved hospital problems. *        Visit Summary: I saw the patient at the bedside with her daughter, Andra Fox, present. She is resting comfortably at this time, Andra Fox confirms that she does want to pursue hospice care, her sister could not be present but I did contact Patsyia by phone to discuss goals and code status. I reviewed hospice services with Patsyia, support given at the facility, and ability to follow at home if family would choose to take the patient home in the future. Patsyia agrees that hospice would best support the patient at this point and expresses noticeable decline in the patient over the past year, with further progression in the past several weeks. We discussed the patient living will and advance directives, believe hospice would fall in line with her stated wishes. I did review code status, resuscitative measures, and information from the patient's living will with [facility], she and her sister have both stated that they would like for the patient to have DNR/DNI code status and focus on comfort care. I have updated code status order and placed the hospice consult. I provided emotional support to the patient's children and all questions answered.  [facility] gave verbal permission for Andra Fox to sign hospice admission documents, Andra Fox is agreeable to sign, and the hospice chaplain has been notified of this information as well. No further issues, patient to return to facility with hospice at discharge. I have notified the attending of the outcome of my visit. Recommendations:   1. Hospice care upon return to [facility]    2. DNR code status, order updated    Thank you for consulting Palliative Care and allowing us to participate in the care of this patient.      Approx 17 min spent discussing ACP and code status with the patient's daughters    Electronically signed by GEORGE Aiken on 10/2/2020 at 11:25 AM    (Please note that portions of this note were completed with a voice recognition program.  Beni Olmedo made to edit the dictations but occasionally words are mis-transcribed.)

## 2020-10-02 NOTE — PLAN OF CARE
Problem: Falls - Risk of:  Goal: Will remain free from falls  Description: Will remain free from falls  10/1/2020 2112 by Ml Houston RN  Outcome: Ongoing  10/1/2020 1546 by Osmin Horton RN  Outcome: Ongoing  Goal: Absence of physical injury  Description: Absence of physical injury  Outcome: Ongoing     Problem: Skin Integrity:  Goal: Will show no infection signs and symptoms  Description: Will show no infection signs and symptoms  Outcome: Ongoing  Goal: Absence of new skin breakdown  Description: Absence of new skin breakdown  10/1/2020 2112 by Ml Houston RN  Outcome: Ongoing  10/1/2020 1546 by Osmin Horton RN  Outcome: Ongoing     Problem: Confusion - Acute:  Goal: Absence of continued neurological deterioration signs and symptoms  Description: Absence of continued neurological deterioration signs and symptoms  Outcome: Ongoing  Goal: Mental status will be restored to baseline  Description: Mental status will be restored to baseline  Outcome: Ongoing     Problem: Discharge Planning:  Goal: Ability to perform activities of daily living will improve  Description: Ability to perform activities of daily living will improve  Outcome: Ongoing  Goal: Participates in care planning  Description: Participates in care planning  Outcome: Ongoing     Problem: Injury - Risk of, Physical Injury:  Goal: Will remain free from falls  Description: Will remain free from falls  10/1/2020 2112 by Ml Houston RN  Outcome: Ongoing  10/1/2020 1546 by Osmin Horton RN  Outcome: Ongoing  Goal: Absence of physical injury  Description: Absence of physical injury  Outcome: Ongoing     Problem: Mood - Altered:  Goal: Mood stable  Description: Mood stable  Outcome: Ongoing  Goal: Absence of abusive behavior  Description: Absence of abusive behavior  Outcome: Ongoing  Goal: Verbalizations of feeling emotionally comfortable while being cared for will increase  Description: Verbalizations of feeling emotionally comfortable

## 2020-10-02 NOTE — CARE COORDINATION
Patient does not need a Covid-19 test to return to [facility].   Electronically signed by Rock Real RN on 10/2/2020 at 11:51 AM

## 2020-10-02 NOTE — PROGRESS NOTES
Speech Language Pathology  Facility/Department: [facility] 4 ONCOLOGY UNIT  SWALLOW THERAPY  SPEECH THERAPY     NAME: J Luis Newell  : 1935  MRN: [id number]    ADMISSION DATE: 2020  ADMITTING DIAGNOSIS: has Major neurocognitive disorder ([facility] [address] Essential (primary) hypertension; OAB (overactive bladder); Familial hypercholesterolemia; Subclinical hyperthyroidism; Edema of left lower extremity; Dehydration; Metabolic encephalopathy; Lactic acidosis; Acute kidney injury ([address] Closed odontoid fracture with routine healing; Hypernatremia; Transaminitis; Palliative care patient; Alzheimer's dementia ([facility] [address] Altered mental status; and Dysphagia on their problem list.    Date of Treat: 10/2/2020  Evaluating Therapist: Belinda Almonte    Current Diet level:  NPO    Reason for Referral  J Luis Newell was referred for a bedside swallow evaluation to assess the efficiency of her swallow function, identify signs and symptoms of aspiration and make recommendations regarding safe dietary consistencies, effective compensatory strategies, and safe eating environment. Impression  Re-assessed patient's swallowing function. Patient exhibits decreased oral prep of more solid consistencies, slow oral transit and suspected swallow delay with even the slowest moving food/drink consistencies possible, and sluggish mildly decreased laryngeal elevation for swallow airway protection. Even so, no outward S/S penetration/aspiration was noted with any ice chip trial, puree consistency trial, or honey thick liquid trial administered during treatment session this date. Did not observe swallow function with any additional liquid consistency secondary to timing of laryngeal movement during swallows.     At this time, do question fluctuating swallow function. If PO intake is pursued, would trial puree consistency and honey thick liquids. TOTAL FEED WITH DRINKS BY SPOON. Recommend meds crushed in pudding/applesauce.    If patient receives mouth care prior to intake, okay for ice chips IN BETWEEN MEALS for comfort. Will continue to follow.     Treatment Plan  Requires SLP Intervention: Yes     Recommended Diet and Intervention  Diet Solids Recommendation: Puree  Liquid Consistency Recommendation: Honey thick  Medication Administration Recommendation: Meds crushed in pudding/applesauce   Therapeutic Interventions: Patient/Family education;Oral Care;Diet Tolerance Monitoring; Therapeutic PO trials with SLP     Treatment/Goals  Timeframe for Short-term Goals: 1x/day for 3 days   Goal 1: Patient will tolerate puree consistency and honey thick liquids with min S/S penetration/aspiration during PO intake. Goal 2: Patient staff will follow swallow safety recommendations to decrease risk of penetration/aspiration during PO intake. Goal 3: Patient will receive daily oral care, via staff, to decrease bacteria from the oral cavity. Goal 4: Re-assessment of swallow function for potential diet upgrade. Goal 5: Monitor speech production.      General  Chart Reviewed: Yes  Behavior/Cognition: Alert; Cooperative  O2 Device: None (Room air)  Communication Observation: (Re-assessed patient's speech production. Patient exhibits decreased volume of speech, decreased labial movements, and slow, decreased lingual movements during verbalizations. SLP ranked functional intelligibility of speech for unfamiliar listeners at 20-30% in utterances without background noise present.)  Follows Directions: Simple   Patient Positioning: Upright in bed  Consistencies Administered: Ice chips;Puree; Honey - spoon     Oral Motor Examination   Labial ROM: (Decreased, bilaterally, during labial retraction trials and labial protrusion trials.)  Labial Strength: (Decreased during labial compression trials.)  Labial Coordination: (Slowed movements were noted.)  Lingual ROM: (Decreased during lingual extension trials with no full point achieved; decreased during lingual

## 2020-10-02 NOTE — ACP (ADVANCE CARE PLANNING)
Advance Care Planning     Advance Care Planning (ACP) Physician/NP/PA (Provider) Sharifa Parekh      Date of ACP Conversation: 9/30/2020    Conversation Conducted with:    Healthcare Decision Maker: Named in Advance Directive or Healthcare Power of  (name) Moe [address] Decision Maker:    Current Designated Health Care Decision Maker:    Primary Decision Maker (Active):  Julissa Jey [facility] - [phone]      Care Preferences:      Resuscitation: DNR/DNI    Conversation Outcomes / Follow-Up Plan:   DNR order entered      Length of Voluntary ACP Conversation in minutes:  approx 17 min spent on ACP discussion and review of living will documentation    Dillon Lugo

## 2020-10-02 NOTE — CARE COORDINATION
SW spoke with [facility]. Pt can dc to the facility and will need a COVID at dc.    Harry  [phone]  [address]  Electronically signed by Rafael Henderson on 10/2/2020 at 10:37 AM

## 2020-10-05 NOTE — TELEPHONE ENCOUNTER
SKILLED NURSING FACILITY:   INITIAL CALL POST-HOSPITAL DISCHARGE    SNF:[facility]    PHONE [address]     / : Raghav    THERAPY:Hospice    ANTICIPATED LENGTH OF STAY:[facility]

## 2020-10-17 NOTE — PROGRESS NOTES
Physician Progress Note      PATIENT:               Steffany Jackson  CSN #:                  [id number]  :                       1935  ADMIT DATE:       2020 2:09 PM  [address] [place] DATE:        10/2/2020 7:42 PM  RESPONDING  PROVIDER #:        Coy GROSS DO          QUERY TEXT:    Patient admitted with dehydration and healing odontoid fracture. Noted   documentation of metabolic encephalopathy. No improvement in mental status was   documented prior to discharge. If possible, please clarify if metabolic   encephalopathy was: The medical record reflects the following:  Risk Factors: Dehydration, possible inadequate nutrition, recent traumatic   injury  Clinical Indicators: lack of responsiveness, lethargy, normally talks at   baseline and now not communicating. Treatment: Dietician consult for nutritional assessment and plan of action,   IVF, monitoring electrolytes. Options provided:  -- Metabolic encephalopathy present as evidenced by, Please document evidence. -- Metabolic encephalopathy was ruled out  -- Other - I will add my own diagnosis  -- Disagree - Not applicable / Not valid  -- Disagree - Clinically unable to determine / Unknown  -- Refer to Clinical Documentation Reviewer    PROVIDER RESPONSE TEXT:    Metabolic encephalopathy is present as evidenced by acute mental status change   with hyponatremia. Did not improve prior to discharge because family chose to   withdraw aggressive care and send to SNF with hospice.     Query created by: Fernanda Holman on 10/16/2020 11:57 AM      Electronically signed by:  Tasha Jean DO 10/17/2020 8:50 AM

## 2020-12-07 ENCOUNTER — TELEPHONE (OUTPATIENT)
Dept: NEUROLOGY | Age: 85
End: 2020-12-07

## 2022-11-15 NOTE — PROGRESS NOTES
[facility] [address] [place] [address] Nayana Hough [phone]  Dept: [phone]  Dept Fax: [phone]  Loc: [phone]    Juana Solano is a 82 y.o. female who presents today for her medical conditions/complaints as noted below. Juana Solano is here for Medicare AWV        HPI:   CC: Here today to discuss the followin-year-old here for annual wellness visit. She suffers from Alzheimer's dementia. She is currently on a combination of Namenda and Aricept. She is accompanied by her daughter today who states she is stable on these medications. However, she does appear to have some agitation and insomnia at night. She does not become overly aggressive she has difficulty calming down in the evenings. Hypertension  Compliant with medications. No adverse effects from medication. No lightheadedness, palpitations, or chest pain. Hyperlipidemia  Tolerating current cholesterol medication without side effects. No body aches. Attempting to reduce processed sugar and cholesterol from diet. He had been seen neurology who had placed her on Sinemet for her parkinsonism's. The family has decided on their own to not take the Sinemet. They do not wish to follow-up with neurology. Patient's daughter plans to admit her mother to [facility] and rehabilitation facility in [address].         HPI    Past Medical History:   Diagnosis Date    DVT (deep venous thrombosis) (HCC)     left lower extremity      Past Surgical History:   Procedure Laterality Date    COLONOSCOPY  2012       Family History   Problem Relation Age of Onset    Colon Cancer Daughter        Social History     Tobacco Use    Smoking status: Never Smoker    Smokeless tobacco: Never Used   Substance Use Topics    Alcohol use: No     Current Outpatient Medications   Medication Sig Dispense Refill    donepezil (ARICEPT) 10 MG tablet TAKE 1 TABLET BY MOUTH ONCE pain, anal bleeding, constipation, diarrhea and nausea. Genitourinary: Negative for difficulty urinating. Psychiatric/Behavioral: Negative. SeeHPI for visit specific review of symptoms. All others negative      Objective:   BP (!) 104/56   Pulse 76   Temp 97.4 °F (36.3 °C)   SpO2 97%   Physical Exam  Physical Exam   Constitutional: She appears well-developed. Does not appear ill. Eyes: Pupils are equal, round, and reactive to light. Conjunctiva and Lids normal.  Neck: Normal range of motion. Neck supple. No masses. Neck Symmetric. Normal tracheal position. No thyroid enlargement  Cardiovascular: Normal rate and regular rhythm. Exam reveals no friction rub. Carotid arteries: no bruit observed. No murmur heard. Respiratory:  Effort normal and breath sounds normal. No respiratory distress. No wheezes. No rales. No use of accessory muscles or intercostal retractions. Abdominal: Soft. Bowel sounds are normal. exhibits no distension. There is no tenderness. There is no rebound and no guarding. Musculoskeletal: exhibits no edema. Normal gait. Neurological: alert. Psychiatric: normal mood and affect. Her behavior is normal. Normal judgement and insight observed. No results found for this or any previous visit (from the past 672 hour(s)). Assessment & Plan: The following diagnoses and conditions are stable with no further orders unless indicated:  1. Annual physical exam  Completed annual wellness visit    2. Major neurocognitive disorder (HCC)  Refill the following medications  - donepezil (ARICEPT) 10 MG tablet; TAKE 1 TABLET BY MOUTH ONCE DAILY AT NIGHT  Dispense: 90 tablet; Refill: 3  - memantine (NAMENDA) 10 MG tablet; TAKE 1 TABLET BY MOUTH TWICE DAILY  Dispense: 180 tablet; Refill: 3    Regarding her insomnia and agitation in the evening would suggest trying Seroquel 12.5 mg in the evening. Discussed risks and benefits of this new medication.  Discussed potential side effects. She voiced understanding. 3. Essential (primary) hypertension  BP Readings from Last 3 Encounters:   07/20/20 (!) 104/56   02/12/20 120/64   12/16/19 121/67     Blood pressure was slightly low today. She is asymptomatic we will continue with current medication and observing her blood pressure    4. Familial hypercholesterolemia  Lab Results   Component Value Date    CHOL 174 02/19/2018     Lab Results   Component Value Date    TRIG 76 02/19/2018     Lab Results   Component Value Date    HDL 74 02/19/2018     Lab Results   Component Value Date    LDLCALC 85 02/19/2018     No results found for: LABVLDL, VLDL  No results found for: CHOLHDLRATIO  Suggest we continue with pravastatin  Suggested we get laboratory work as it has been 2 years since her last lab check. Return in about 6 months (around 1/20/2021) for Routine follow up - 15 minute visit. Discussed use, benefit, and side effects of prescribed medications. All patient questions answered. Pt voiced understanding. Reviewed health maintenance. Instructedto continue current medications, diet and exercise. Patient agreed with treatmentplan. Follow up as directed.        Note dictated using [id number] [place] [employer] [facility]Medications

## 2024-01-29 NOTE — TELEPHONE ENCOUNTER
Patient Contacted for the patient to call back and schedule the following:    [facility]  Provider: XIOMARA  Return date: 04/22/2024  Specialty phone number: [phone]  Additional appointment(s) needed: N/A  Additonal Notes: N/A    Pt .  Passed on 10/26/20 per daughter